# Patient Record
Sex: MALE | Race: WHITE | NOT HISPANIC OR LATINO | Employment: FULL TIME | URBAN - NONMETROPOLITAN AREA
[De-identification: names, ages, dates, MRNs, and addresses within clinical notes are randomized per-mention and may not be internally consistent; named-entity substitution may affect disease eponyms.]

---

## 2017-07-03 ENCOUNTER — OFFICE VISIT (OUTPATIENT)
Dept: CARDIOLOGY | Facility: CLINIC | Age: 67
End: 2017-07-03

## 2017-07-03 VITALS
HEIGHT: 70 IN | WEIGHT: 190 LBS | DIASTOLIC BLOOD PRESSURE: 80 MMHG | BODY MASS INDEX: 27.2 KG/M2 | HEART RATE: 56 BPM | SYSTOLIC BLOOD PRESSURE: 130 MMHG

## 2017-07-03 DIAGNOSIS — I25.9 IHD (ISCHEMIC HEART DISEASE): Primary | ICD-10-CM

## 2017-07-03 DIAGNOSIS — I34.0 NON-RHEUMATIC MITRAL REGURGITATION: ICD-10-CM

## 2017-07-03 DIAGNOSIS — E11.9 TYPE 2 DIABETES MELLITUS WITHOUT COMPLICATION, WITHOUT LONG-TERM CURRENT USE OF INSULIN (HCC): ICD-10-CM

## 2017-07-03 DIAGNOSIS — I10 ESSENTIAL HYPERTENSION: ICD-10-CM

## 2017-07-03 DIAGNOSIS — E78.49 OTHER HYPERLIPIDEMIA: ICD-10-CM

## 2017-07-03 PROCEDURE — 99213 OFFICE O/P EST LOW 20 MIN: CPT | Performed by: NURSE PRACTITIONER

## 2017-07-03 NOTE — PROGRESS NOTES
Chief Complaint   Patient presents with   • Follow-up     He denies any chest pain, palpitations, no dizziness and no shortness of breath. He states pushes mows 2-3 yards a week. He states had labs 2 weeks ago per PCP. Patient verbalized current medication list.       Cardiac Complaints  none      Subjective   Flako Mcfarland is a 66 y.o. male with a history of IHD diagnosed in 2004 when he had stenting. Most recent cardiac cath in 2009 showed stent in LAD which was patent. He then had about 80% disease of the diagonal which has been managed medically. Most recent stress in January of this year showed only fixed defect, medical management encouraged. Labs he reports with PCP, no recent copies available, he did have some done 3 weeks ago.   He comes today for follow up and denies any cardiac concerns, he denies any chest pain, palpitations, or dizziness.  He is push mowing his yard 2 to 3 times a week without concerns.  No medication refills are needed today.  Medication list confirmed by patient.        Cardiac History  Past Surgical History:   Procedure Laterality Date   • CARDIAC CATHETERIZATION  2004    MI & One stent.   • CARDIAC CATHETERIZATION  11/09/2009    Patent stent in the LAD.  80% D2.   • CARDIOVASCULAR STRESS TEST  04/14/2008    S. Echo- 9 min, 30 sec., 80% THR. Inferior infarct.   • CARDIOVASCULAR STRESS TEST  10/15/2009    9 min. 84% /96 Inferoseptal infarct with lani-infarct ischemia.   • CARDIOVASCULAR STRESS TEST  01/11/2013    9 min, 81% THR, 182/80 Large infarct with  mild lani-infarct ischemia.   • CARDIOVASCULAR STRESS TEST  01/14/2016    EF 54%, fixed defect no reversibility noted   • ECHO - CONVERTED  02/29/2008    EF 60%, RVSP- 42mmHg   • ECHO - CONVERTED  10/15/2009    EF 55% Inferoseptal WMA. RVSP- 37mmHg   • ECHO - CONVERTED  01/11/2013    EF 50-55%, RVSP 40 mmHg.   • ECHO - CONVERTED  01/14/2016    EF 50-55%, mild MR, mod pulm HTN       Current Outpatient Prescriptions    Medication Sig Dispense Refill   • aspirin 81 MG EC tablet Take 81 mg by mouth Daily.     • clopidogrel (PLAVIX) 75 MG tablet Take 75 mg by mouth Daily.     • Dulaglutide (TRULICITY) 1.5 MG/0.5ML solution pen-injector Inject  under the skin 1 (One) Time Per Week.     • gemfibrozil (LOPID) 600 MG tablet Take 600 mg by mouth Daily.     • isosorbide mononitrate (IMDUR) 30 MG 24 hr tablet TAKE 1 TABLET BY MOUTH EVERY MORNING 30 tablet 8   • lisinopril (PRINIVIL,ZESTRIL) 10 MG tablet Take 10 mg by mouth 2 (Two) Times a Day.     • metFORMIN (GLUCOPHAGE) 500 MG tablet Take 2 tabs twice daily      • metoprolol succinate XL (TOPROL-XL) 25 MG 24 hr tablet TAKE ONE TABLET BY MOUTH EVERY DAY 30 tablet 8   • rosuvastatin (CRESTOR) 20 MG tablet Take 20 mg by mouth Daily.     • SITagliptin (JANUVIA) 100 MG tablet Take 100 mg by mouth Daily.       No current facility-administered medications for this visit.        Review of patient's allergies indicates no known allergies.    Past Medical History:   Diagnosis Date   • Arthritis    • Diabetes mellitus    • Hyperlipidemia    • Hypertension    • IHD (ischemic heart disease)     Chronic   • Mitral valve insufficiency        Social History     Social History   • Marital status:      Spouse name: N/A   • Number of children: N/A   • Years of education: N/A     Occupational History   • Not on file.     Social History Main Topics   • Smoking status: Former Smoker     Quit date: 06/2004   • Smokeless tobacco: Never Used   • Alcohol use Yes      Comment: Occasional glass of wine   • Drug use: No   • Sexual activity: Not on file     Other Topics Concern   • Not on file     Social History Narrative       Family History   Problem Relation Age of Onset   • Hypertension Mother    • Hyperlipidemia Mother    • Heart disease Father        Review of Systems   Constitution: Negative for weakness and malaise/fatigue.   HENT: Negative for congestion and hoarse voice.    Cardiovascular: Negative  "for chest pain, dyspnea on exertion, irregular heartbeat and palpitations.   Musculoskeletal: Negative for arthritis and back pain.   Gastrointestinal: Negative for anorexia, heartburn, nausea and vomiting.   Genitourinary: Negative for hematuria and hesitancy.   Neurological: Negative for dizziness and numbness.   Psychiatric/Behavioral: Negative for altered mental status, depression and memory loss.       DiabetesYes  Thyroidnormal    Objective     /80 (BP Location: Left arm)  Pulse 56  Ht 70\" (177.8 cm)  Wt 190 lb (86.2 kg)  BMI 27.26 kg/m2    Physical Exam   Constitutional: He is oriented to person, place, and time. He appears well-developed and well-nourished.   HENT:   Head: Normocephalic and atraumatic.   Eyes: Conjunctivae are normal. Pupils are equal, round, and reactive to light.   Neck: Normal range of motion. Neck supple.   Cardiovascular: Regular rhythm.  Bradycardia present.    Murmur heard.  Pulmonary/Chest: Effort normal and breath sounds normal.   Abdominal: Soft.   Musculoskeletal: Normal range of motion.   Neurological: He is alert and oriented to person, place, and time.   Skin: Skin is warm and dry.   Psychiatric: He has a normal mood and affect. His behavior is normal.       Procedures    Assessment/Plan     HR is slightly bradycardic, most likely related to beta blocker therapy.  BP is stable.  No new testing will be advised today since no new concerns are voiced.  No changes in meds will be made.  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  Labs he reports with you, could we get next copy?  No refills are needed as they are filled with you.  Good cardiac ADA diet and activity as tolerated advised.  He does report recent AIC was elevated, he has been changing his diet.  6 month follow up advised or sooner if needed.      Problems Addressed this Visit        Cardiovascular and Mediastinum    IHD (ischemic heart disease) - Primary "    HTN (hypertension)    Hyperlipemia    MR (mitral regurgitation)       Endocrine    DM (diabetes mellitus)    Relevant Medications    Dulaglutide (TRULICITY) 1.5 MG/0.5ML solution pen-injector              Electronically signed by WAQAR Nascimento July 3, 2017 10:31 AM

## 2017-09-05 RX ORDER — ISOSORBIDE MONONITRATE 30 MG/1
TABLET, EXTENDED RELEASE ORAL
Qty: 30 TABLET | Refills: 8 | Status: SHIPPED | OUTPATIENT
Start: 2017-09-05 | End: 2020-06-29 | Stop reason: SDUPTHER

## 2017-09-05 RX ORDER — METOPROLOL SUCCINATE 25 MG/1
TABLET, EXTENDED RELEASE ORAL
Qty: 30 TABLET | Refills: 8 | Status: SHIPPED | OUTPATIENT
Start: 2017-09-05 | End: 2018-09-17 | Stop reason: SDUPTHER

## 2018-02-15 ENCOUNTER — OFFICE VISIT (OUTPATIENT)
Dept: CARDIOLOGY | Facility: CLINIC | Age: 68
End: 2018-02-15

## 2018-02-15 VITALS
HEART RATE: 64 BPM | WEIGHT: 189 LBS | DIASTOLIC BLOOD PRESSURE: 68 MMHG | SYSTOLIC BLOOD PRESSURE: 114 MMHG | BODY MASS INDEX: 27.06 KG/M2 | HEIGHT: 70 IN

## 2018-02-15 DIAGNOSIS — I10 ESSENTIAL HYPERTENSION: ICD-10-CM

## 2018-02-15 DIAGNOSIS — E78.00 PURE HYPERCHOLESTEROLEMIA: ICD-10-CM

## 2018-02-15 DIAGNOSIS — R01.1 CARDIAC MURMUR: ICD-10-CM

## 2018-02-15 DIAGNOSIS — I25.9 IHD (ISCHEMIC HEART DISEASE): Primary | ICD-10-CM

## 2018-02-15 DIAGNOSIS — E11.9 TYPE 2 DIABETES MELLITUS WITHOUT COMPLICATION, WITHOUT LONG-TERM CURRENT USE OF INSULIN (HCC): ICD-10-CM

## 2018-02-15 PROCEDURE — 99214 OFFICE O/P EST MOD 30 MIN: CPT | Performed by: NURSE PRACTITIONER

## 2018-02-15 RX ORDER — NITROGLYCERIN 0.4 MG/1
TABLET SUBLINGUAL
Qty: 25 TABLET | Refills: 6 | Status: SHIPPED | OUTPATIENT
Start: 2018-02-15 | End: 2018-09-17 | Stop reason: SDUPTHER

## 2018-02-15 RX ORDER — GLYBURIDE 5 MG/1
10 TABLET ORAL 2 TIMES DAILY WITH MEALS
COMMUNITY

## 2018-02-15 NOTE — PROGRESS NOTES
Chief Complaint   Patient presents with   • Follow-up     cardiac management, patient brought copy of most recent labs with visit.    • Med Refill     request script for NTG. patient brought medication list with visit.        Subjective       Flako Mcfarland is a 67 y.o. male with a history of IHD diagnosed in 2004 when he had stenting. Cardiac cath in 2009 showed stent in LAD which was patent and 80% disease of the diagonal which has been managed medically. Stress in January 2016 showed only fixed defect, medical management encouraged.   Today he comes to the office for a follow up visit and no chest pain or palpitations reported. He maintains active lifestyle. His current concern is increase in HA1C.     HPI         Cardiac History:    Past Surgical History:   Procedure Laterality Date   • CARDIAC CATHETERIZATION  2004    MI & One stent.   • CARDIAC CATHETERIZATION  11/09/2009    Patent stent in the LAD.  80% D2.   • CARDIOVASCULAR STRESS TEST  04/14/2008    S. Echo- 9 min, 30 sec., 80% THR. Inferior infarct.   • CARDIOVASCULAR STRESS TEST  10/15/2009    9 min. 84% /96 Inferoseptal infarct with lani-infarct ischemia.   • CARDIOVASCULAR STRESS TEST  01/11/2013    9 min, 81% THR, 182/80 Large infarct with  mild lani-infarct ischemia.   • CARDIOVASCULAR STRESS TEST  01/14/2016    EF 54%, fixed defect no reversibility noted   • ECHO - CONVERTED  02/29/2008    EF 60%, RVSP- 42mmHg   • ECHO - CONVERTED  10/15/2009    EF 55% Inferoseptal WMA. RVSP- 37mmHg   • ECHO - CONVERTED  01/11/2013    EF 50-55%, RVSP 40 mmHg.   • ECHO - CONVERTED  01/14/2016    EF 50-55%, mild MR, mod pulm HTN       Current Outpatient Prescriptions   Medication Sig Dispense Refill   • aspirin 81 MG EC tablet Take 81 mg by mouth Daily.     • clopidogrel (PLAVIX) 75 MG tablet Take 75 mg by mouth Daily.     • Dulaglutide (TRULICITY) 1.5 MG/0.5ML solution pen-injector Inject  under the skin 1 (One) Time Per Week.     • gemfibrozil (LOPID) 600  MG tablet Take 600 mg by mouth Daily.     • glyBURIDE (DIAbeta) 5 MG tablet Take 5 mg by mouth 2 (Two) Times a Day With Meals.     • isosorbide mononitrate (IMDUR) 30 MG 24 hr tablet TAKE ONE TABLET BY MOUTH EVERY MORNING 30 tablet 8   • lisinopril (PRINIVIL,ZESTRIL) 10 MG tablet Take 10 mg by mouth 2 (Two) Times a Day.     • metFORMIN (GLUCOPHAGE) 500 MG tablet Take 2 tabs twice daily      • metoprolol succinate XL (TOPROL-XL) 25 MG 24 hr tablet TAKE ONE TABLET BY MOUTH DAILY 30 tablet 8   • rosuvastatin (CRESTOR) 20 MG tablet Take 20 mg by mouth Daily.     • SITagliptin (JANUVIA) 100 MG tablet Take 100 mg by mouth Daily.     • nitroglycerin (NITROSTAT) 0.4 MG SL tablet 1 under the tongue as needed for angina, may repeat q5mins for up three doses 25 tablet 6     No current facility-administered medications for this visit.        Review of patient's allergies indicates no known allergies.    Past Medical History:   Diagnosis Date   • Arthritis    • Diabetes mellitus    • Hyperlipidemia    • Hypertension    • IHD (ischemic heart disease)     Chronic   • Mitral valve insufficiency        Social History     Social History   • Marital status:      Spouse name: N/A   • Number of children: N/A   • Years of education: N/A     Occupational History   • Not on file.     Social History Main Topics   • Smoking status: Former Smoker     Quit date: 06/2004   • Smokeless tobacco: Never Used   • Alcohol use Yes      Comment: Occasional glass of wine   • Drug use: No   • Sexual activity: Not on file     Other Topics Concern   • Not on file     Social History Narrative       Family History   Problem Relation Age of Onset   • Hypertension Mother    • Hyperlipidemia Mother    • Heart disease Father        Review of Systems   Constitution: Negative for decreased appetite, diaphoresis, malaise/fatigue and weight gain.   HENT: Negative for congestion and nosebleeds.    Cardiovascular: Negative for chest pain, irregular heartbeat,  "leg swelling, near-syncope and palpitations.   Respiratory: Negative for cough, shortness of breath and sleep disturbances due to breathing.    Skin: Negative for itching and rash.   Musculoskeletal: Negative for falls and muscle weakness.   Gastrointestinal: Negative for change in bowel habit and melena.   Genitourinary: Negative for dysuria and hematuria.   Neurological: Negative for dizziness, light-headedness and loss of balance.      Diabetes- Yes  Thyroid-normal    Objective     /68 (BP Location: Right arm)  Pulse 64  Ht 177.8 cm (70\")  Wt 85.7 kg (189 lb)  BMI 27.12 kg/m2    Physical Exam   Constitutional: He is oriented to person, place, and time. He appears well-nourished.   HENT:   Head: Normocephalic.   Eyes: Conjunctivae are normal. Pupils are equal, round, and reactive to light.   Neck: Normal range of motion. Neck supple. No JVD present.   Cardiovascular: Normal rate, regular rhythm, S1 normal and S2 normal.    No murmur heard.  Pulmonary/Chest: Effort normal and breath sounds normal. He has no wheezes. He has no rales.   Abdominal: Soft. Bowel sounds are normal. He exhibits no distension. There is no tenderness.   Musculoskeletal: Normal range of motion. He exhibits no edema or tenderness.   Neurological: He is alert and oriented to person, place, and time.   Skin: Skin is warm. No rash noted.   Psychiatric: He has a normal mood and affect. His behavior is normal. Judgment and thought content normal.   Vitals reviewed.     Procedures: none today          Assessment/Plan      Flako was seen today for follow-up and med refill.    Diagnoses and all orders for this visit:    IHD (ischemic heart disease)  -     Stress Test With Myocardial Perfusion One Day; Future  -     Adult Transthoracic Echo Complete W/ Cont if Necessary Per Protocol; Future    Essential hypertension  -     Stress Test With Myocardial Perfusion One Day; Future  -     Adult Transthoracic Echo Complete W/ Cont if Necessary Per " Protocol; Future    Pure hypercholesterolemia  -     Stress Test With Myocardial Perfusion One Day; Future  -     Adult Transthoracic Echo Complete W/ Cont if Necessary Per Protocol; Future    Cardiac murmur  -     Stress Test With Myocardial Perfusion One Day; Future  -     Adult Transthoracic Echo Complete W/ Cont if Necessary Per Protocol; Future    Type 2 diabetes mellitus without complication, without long-term current use of insulin    Other orders  -     nitroglycerin (NITROSTAT) 0.4 MG SL tablet; 1 under the tongue as needed for angina, may repeat q5mins for up three doses      Mr. Mcfarland brought a copy of most recent lab which shows lipids at goal. Advised to continue Crestor. His HA1C has increased. He will follow with you and consider referral to endocrinology. We did discuss diet management and he reports recent improvements.  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 Hs blood pressure and heart rate are normal today. We will continue same cardiac medications at this time. Nitrostat was updated due to expiration, he has not had to use any for symptoms.   He also reports recent abnormal lab prior to donating blood and is scheduled to see hematology in near future. I do not have a copy of that report.            Electronically signed by WAQAR Schroeder,  February 16, 2018 1:20 PM

## 2018-02-15 NOTE — PATIENT INSTRUCTIONS
Diabetes Mellitus and Food  It is important for you to manage your blood sugar (glucose) level. Your blood glucose level can be greatly affected by what you eat. Eating healthier foods in the appropriate amounts throughout the day at about the same time each day will help you control your blood glucose level. It can also help slow or prevent worsening of your diabetes mellitus. Healthy eating may even help you improve the level of your blood pressure and reach or maintain a healthy weight.  General recommendations for healthful eating and cooking habits include:  · Eating meals and snacks regularly. Avoid going long periods of time without eating to lose weight.  · Eating a diet that consists mainly of plant-based foods, such as fruits, vegetables, nuts, legumes, and whole grains.  · Using low-heat cooking methods, such as baking, instead of high-heat cooking methods, such as deep frying.  Work with your dietitian to make sure you understand how to use the Nutrition Facts information on food labels.  How can food affect me?  Carbohydrates   Carbohydrates affect your blood glucose level more than any other type of food. Your dietitian will help you determine how many carbohydrates to eat at each meal and teach you how to count carbohydrates. Counting carbohydrates is important to keep your blood glucose at a healthy level, especially if you are using insulin or taking certain medicines for diabetes mellitus.  Alcohol   Alcohol can cause sudden decreases in blood glucose (hypoglycemia), especially if you use insulin or take certain medicines for diabetes mellitus. Hypoglycemia can be a life-threatening condition. Symptoms of hypoglycemia (sleepiness, dizziness, and disorientation) are similar to symptoms of having too much alcohol.  If your health care provider has given you approval to drink alcohol, do so in moderation and use the following guidelines:  · Women should not have more than one drink per day, and men  should not have more than two drinks per day. One drink is equal to:  ¨ 12 oz of beer.  ¨ 5 oz of wine.  ¨ 1½ oz of hard liquor.  · Do not drink on an empty stomach.  · Keep yourself hydrated. Have water, diet soda, or unsweetened iced tea.  · Regular soda, juice, and other mixers might contain a lot of carbohydrates and should be counted.  What foods are not recommended?  As you make food choices, it is important to remember that all foods are not the same. Some foods have fewer nutrients per serving than other foods, even though they might have the same number of calories or carbohydrates. It is difficult to get your body what it needs when you eat foods with fewer nutrients. Examples of foods that you should avoid that are high in calories and carbohydrates but low in nutrients include:  · Trans fats (most processed foods list trans fats on the Nutrition Facts label).  · Regular soda.  · Juice.  · Candy.  · Sweets, such as cake, pie, doughnuts, and cookies.  · Fried foods.  What foods can I eat?  Eat nutrient-rich foods, which will nourish your body and keep you healthy. The food you should eat also will depend on several factors, including:  · The calories you need.  · The medicines you take.  · Your weight.  · Your blood glucose level.  · Your blood pressure level.  · Your cholesterol level.  You should eat a variety of foods, including:  · Protein.  ¨ Lean cuts of meat.  ¨ Proteins low in saturated fats, such as fish, egg whites, and beans. Avoid processed meats.  · Fruits and vegetables.  ¨ Fruits and vegetables that may help control blood glucose levels, such as apples, mangoes, and yams.  · Dairy products.  ¨ Choose fat-free or low-fat dairy products, such as milk, yogurt, and cheese.  · Grains, bread, pasta, and rice.  ¨ Choose whole grain products, such as multigrain bread, whole oats, and brown rice. These foods may help control blood pressure.  · Fats.  ¨ Foods containing healthful fats, such as nuts,  avocado, olive oil, canola oil, and fish.  Does everyone with diabetes mellitus have the same meal plan?  Because every person with diabetes mellitus is different, there is not one meal plan that works for everyone. It is very important that you meet with a dietitian who will help you create a meal plan that is just right for you.  This information is not intended to replace advice given to you by your health care provider. Make sure you discuss any questions you have with your health care provider.  Document Released: 09/14/2006 Document Revised: 05/25/2017 Document Reviewed: 11/14/2014  [employer] Interactive Patient Education © 2017 [employer]

## 2018-02-27 ENCOUNTER — OUTSIDE FACILITY SERVICE (OUTPATIENT)
Dept: CARDIOLOGY | Facility: CLINIC | Age: 68
End: 2018-02-27

## 2018-02-27 ENCOUNTER — HOSPITAL ENCOUNTER (OUTPATIENT)
Dept: CARDIOLOGY | Facility: HOSPITAL | Age: 68
Discharge: HOME | End: 2018-02-27

## 2018-02-27 DIAGNOSIS — I10 ESSENTIAL HYPERTENSION: ICD-10-CM

## 2018-02-27 DIAGNOSIS — I25.9 IHD (ISCHEMIC HEART DISEASE): ICD-10-CM

## 2018-02-27 DIAGNOSIS — E78.00 PURE HYPERCHOLESTEROLEMIA: ICD-10-CM

## 2018-02-27 DIAGNOSIS — R01.1 CARDIAC MURMUR: ICD-10-CM

## 2018-02-27 LAB
MAXIMAL PREDICTED HEART RATE: 154 BPM
MAXIMAL PREDICTED HEART RATE: 154 BPM
STRESS TARGET HR: 131 BPM
STRESS TARGET HR: 131 BPM

## 2018-02-27 PROCEDURE — 93306 TTE W/DOPPLER COMPLETE: CPT

## 2018-02-27 PROCEDURE — 93017 CV STRESS TEST TRACING ONLY: CPT

## 2018-02-27 PROCEDURE — 0 TECHNETIUM SESTAMIBI: Performed by: INTERNAL MEDICINE

## 2018-02-27 PROCEDURE — 78452 HT MUSCLE IMAGE SPECT MULT: CPT | Performed by: INTERNAL MEDICINE

## 2018-02-27 PROCEDURE — 93018 CV STRESS TEST I&R ONLY: CPT | Performed by: INTERNAL MEDICINE

## 2018-02-27 PROCEDURE — A9500 TC99M SESTAMIBI: HCPCS | Performed by: INTERNAL MEDICINE

## 2018-02-27 PROCEDURE — 93306 TTE W/DOPPLER COMPLETE: CPT | Performed by: INTERNAL MEDICINE

## 2018-02-27 PROCEDURE — 78452 HT MUSCLE IMAGE SPECT MULT: CPT

## 2018-02-27 RX ADMIN — TECHNETIUM TC 99M SESTAMIBI 1 DOSE: 1 INJECTION INTRAVENOUS at 11:45

## 2018-03-01 ENCOUNTER — TELEPHONE (OUTPATIENT)
Dept: CARDIOLOGY | Facility: CLINIC | Age: 68
End: 2018-03-01

## 2018-03-01 NOTE — TELEPHONE ENCOUNTER
Patient aware of stress test results and recommendations.   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    Patient requests I forward this information to Dr Nathan Ha, Chiropractor in [place], [place].    I have left a message at [phone] to confirm the fax #.

## 2018-03-05 NOTE — TELEPHONE ENCOUNTER
Patient called to give me the fax # for Dr. Nathan Ha  Fax# [phone].  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

## 2018-09-17 ENCOUNTER — OFFICE VISIT (OUTPATIENT)
Dept: CARDIOLOGY | Facility: CLINIC | Age: 68
End: 2018-09-17

## 2018-09-17 VITALS
HEART RATE: 64 BPM | WEIGHT: 188 LBS | HEIGHT: 70 IN | DIASTOLIC BLOOD PRESSURE: 82 MMHG | BODY MASS INDEX: 26.92 KG/M2 | SYSTOLIC BLOOD PRESSURE: 138 MMHG

## 2018-09-17 DIAGNOSIS — I34.0 NON-RHEUMATIC MITRAL REGURGITATION: ICD-10-CM

## 2018-09-17 DIAGNOSIS — E11.9 TYPE 2 DIABETES MELLITUS WITHOUT COMPLICATION, WITHOUT LONG-TERM CURRENT USE OF INSULIN (HCC): ICD-10-CM

## 2018-09-17 DIAGNOSIS — I25.9 IHD (ISCHEMIC HEART DISEASE): Primary | ICD-10-CM

## 2018-09-17 DIAGNOSIS — E78.00 PURE HYPERCHOLESTEROLEMIA: ICD-10-CM

## 2018-09-17 DIAGNOSIS — I10 ESSENTIAL HYPERTENSION: ICD-10-CM

## 2018-09-17 PROCEDURE — 99213 OFFICE O/P EST LOW 20 MIN: CPT | Performed by: NURSE PRACTITIONER

## 2018-09-17 RX ORDER — METOPROLOL SUCCINATE 25 MG/1
25 TABLET, EXTENDED RELEASE ORAL DAILY
Qty: 30 TABLET | Refills: 12 | Status: SHIPPED | OUTPATIENT
Start: 2018-09-17 | End: 2019-10-18 | Stop reason: SDUPTHER

## 2018-09-17 RX ORDER — GEMFIBROZIL 600 MG/1
600 TABLET, FILM COATED ORAL DAILY
Qty: 30 TABLET | Refills: 12 | Status: SHIPPED | OUTPATIENT
Start: 2018-09-17 | End: 2019-10-18 | Stop reason: SDUPTHER

## 2018-09-17 RX ORDER — NITROGLYCERIN 0.4 MG/1
TABLET SUBLINGUAL
Qty: 25 TABLET | Refills: 6 | Status: SHIPPED | OUTPATIENT
Start: 2018-09-17 | End: 2023-03-06 | Stop reason: SDUPTHER

## 2018-09-17 RX ORDER — ROSUVASTATIN CALCIUM 20 MG/1
20 TABLET, COATED ORAL DAILY
Qty: 30 TABLET | Refills: 12 | Status: SHIPPED | OUTPATIENT
Start: 2018-09-17 | End: 2020-06-29 | Stop reason: SDUPTHER

## 2018-09-17 RX ORDER — CLOPIDOGREL BISULFATE 75 MG/1
75 TABLET ORAL DAILY
Qty: 30 TABLET | Refills: 12 | Status: SHIPPED | OUTPATIENT
Start: 2018-09-17 | End: 2019-10-18 | Stop reason: SDUPTHER

## 2018-09-17 NOTE — PATIENT INSTRUCTIONS
Cholesterol  Cholesterol is a fat. Your body needs a small amount of cholesterol. Cholesterol (plaque) may build up in your blood vessels (arteries). That makes you more likely to have a heart attack or stroke.  You cannot feel your cholesterol level. Having a blood test is the only way to find out if your level is high. Keep your test results. Work with your doctor to keep your cholesterol at a good level.  What do the results mean?  · Total cholesterol is how much cholesterol is in your blood.  · LDL is bad cholesterol. This is the type that can build up. Try to have low LDL.  · HDL is good cholesterol. It cleans your blood vessels and carries LDL away. Try to have high HDL.  · Triglycerides are fat that the body can store or burn for energy.  What are good levels of cholesterol?  · Total cholesterol below 200.  · LDL below 100 is good for people who have health risks. LDL below 70 is good for people who have very high risks.  · HDL above 40 is good. It is best to have HDL of 60 or higher.  · Triglycerides below 150.  How can I lower my cholesterol?  Diet  Follow your diet program as told by your doctor.  · Choose fish, white meat chicken, or turkey that is roasted or baked. Try not to eat red meat, fried foods, sausage, or lunch meats.  · Eat lots of fresh fruits and vegetables.  · Choose whole grains, beans, pasta, potatoes, and cereals.  · Choose olive oil, corn oil, or canola oil. Only use small amounts.  · Try not to eat butter, mayonnaise, shortening, or palm kernel oils.  · Try not to eat foods with trans fats.  · Choose low-fat or nonfat dairy foods.  ? Drink skim or nonfat milk.  ? Eat low-fat or nonfat yogurt and cheeses.  ? Try not to drink whole milk or cream.  ? Try not to eat ice cream, egg yolks, or full-fat cheeses.  · Healthy desserts include mila food cake, jacinda snaps, animal crackers, hard candy, popsicles, and low-fat or nonfat frozen yogurt. Try not to eat pastries, cakes, pies, and  cookies.    Exercise  Follow your exercise program as told by your doctor.  · Be more active. Try gardening, walking, and taking the stairs.  · Ask your doctor about ways that you can be more active.    Medicine  · Take over-the-counter and prescription medicines only as told by your doctor.  This information is not intended to replace advice given to you by your health care provider. Make sure you discuss any questions you have with your health care provider.  Document Released: 03/16/2010 Document Revised: 07/19/2017 Document Reviewed: 06/29/2017  [employer] Interactive Patient Education © 2018 [employer]

## 2018-09-17 NOTE — PROGRESS NOTES
Chief Complaint   Patient presents with   • Follow-up     For cardiac management. Brought copy of recent labs.    • Med Refill     Needs refills on cardiac meds for cardiac meds for 30 days to [facility]. Also asking for a refill on Nitro. Didn't bring med list- went over verbally.        Subjective       Flako Mcfarland is a 68 y.o. male  with a history of IHD diagnosed in 2004 when he had stenting. Cardiac cath in 2009 showed stent in LAD which was patent and 80% disease of the diagonal which has been managed medically. Stress in January 2016 showed only fixed defect, medical management contiinued. He has [employer] licenses and at his 2/15/18 office visit, repeat cardiac workup ordered per CDL guidelines. Stress showed anterior infarct without ischemia. LVEF 55-60% and RVSP 36 mmHg. 8/17/18 lab report shows , triglycerides 110, LDL 73 and HDL 39. HA1C is 6.8 improved from previous.     Today he comes to the office for a follow up visit and no cardiac complaints are voiced. He maintains his won yard work including push mowing and maintains full time work. He has not had chest pain or palpitations. No shortness of breath or episodes of dizziness noted.     HPI     Cardiac History:    Past Surgical History:   Procedure Laterality Date   • CARDIAC CATHETERIZATION  2004    MI & One stent.   • CARDIAC CATHETERIZATION  11/09/2009    Patent stent in the LAD.  80% D2.   • CARDIOVASCULAR STRESS TEST  04/14/2008    S. Echo- 9 min, 30 sec., 80% THR. Inferior infarct.   • CARDIOVASCULAR STRESS TEST  10/15/2009    9 min. 84% /96 Inferoseptal infarct with lani-infarct ischemia.   • CARDIOVASCULAR STRESS TEST  01/11/2013    9 min, 81% THR, 182/80 Large infarct with  mild lani-infarct ischemia.   • CARDIOVASCULAR STRESS TEST  01/14/2016    9 Min, 32 Secs.83% THR. EF 54%. Anteroseptal Infarct   • CARDIOVASCULAR STRESS TEST  02/27/2018    10 Min, 83% THR. 12.2 Mets. EF 57%. Anterior Infarct,.   • ECHO - CONVERTED   02/29/2008    EF 60%, RVSP- 42mmHg   • ECHO - CONVERTED  10/15/2009    EF 55% Inferoseptal WMA. RVSP- 37mmHg   • ECHO - CONVERTED  01/11/2013    EF 50-55%, RVSP 40 mmHg.   • ECHO - CONVERTED  01/14/2016    EF 50-55%, mild MR, mod pulm HTN   • ECHO - CONVERTED  02/27/2018    EF 55%. RVSP- 36 mmHg       Current Outpatient Prescriptions   Medication Sig Dispense Refill   • aspirin 81 MG EC tablet Take 81 mg by mouth Daily.     • clopidogrel (PLAVIX) 75 MG tablet Take 1 tablet by mouth Daily. 30 tablet 12   • Dulaglutide (TRULICITY) 1.5 MG/0.5ML solution pen-injector Inject  under the skin 1 (One) Time Per Week.     • gemfibrozil (LOPID) 600 MG tablet Take 1 tablet by mouth Daily. 30 tablet 12   • glyBURIDE (DIAbeta) 5 MG tablet Take 5 mg by mouth 2 (Two) Times a Day With Meals.     • isosorbide mononitrate (IMDUR) 30 MG 24 hr tablet TAKE ONE TABLET BY MOUTH EVERY MORNING 30 tablet 8   • lisinopril (PRINIVIL,ZESTRIL) 10 MG tablet Take 10 mg by mouth 2 (Two) Times a Day.     • metFORMIN (GLUCOPHAGE) 500 MG tablet Take 2 tabs twice daily      • metoprolol succinate XL (TOPROL-XL) 25 MG 24 hr tablet Take 1 tablet by mouth Daily. 30 tablet 12   • nitroglycerin (NITROSTAT) 0.4 MG SL tablet 1 under the tongue as needed for angina, may repeat q5mins for up three doses 25 tablet 6   • rosuvastatin (CRESTOR) 20 MG tablet Take 1 tablet by mouth Daily. 30 tablet 12   • SITagliptin (JANUVIA) 100 MG tablet Take 100 mg by mouth Daily.       No current facility-administered medications for this visit.        Patient has no known allergies.    Past Medical History:   Diagnosis Date   • Arthritis    • Diabetes mellitus (CMS/HCC)    • Hyperlipidemia    • Hypertension    • IHD (ischemic heart disease)     Chronic   • Mitral valve insufficiency        Social History     Social History   • Marital status:      Spouse name: N/A   • Number of children: N/A   • Years of education: N/A     Occupational History   • Not on file.     Social  "History Main Topics   • Smoking status: Former Smoker     Quit date: 06/2004   • Smokeless tobacco: Never Used   • Alcohol use Yes      Comment: Occasional glass of wine   • Drug use: No   • Sexual activity: Not on file     Other Topics Concern   • Not on file     Social History Narrative   • No narrative on file       Family History   Problem Relation Age of Onset   • Hypertension Mother    • Hyperlipidemia Mother    • Heart disease Father        Review of Systems   Constitution: Negative for decreased appetite, weakness and malaise/fatigue.   HENT: Negative for congestion, hoarse voice and nosebleeds.    Eyes: Negative for redness and visual disturbance.   Cardiovascular: Negative for chest pain, claudication, dyspnea on exertion, leg swelling, near-syncope and palpitations.   Respiratory: Negative for cough, shortness of breath and sleep disturbances due to breathing.    Hematologic/Lymphatic: Negative for bleeding problem. Bruises/bleeds easily.   Skin: Negative for dry skin and itching.   Musculoskeletal: Negative for muscle cramps and muscle weakness.   Gastrointestinal: Negative for change in bowel habit and melena.   Genitourinary: Negative for dysuria and hematuria.   Neurological: Negative for dizziness, headaches, light-headedness, loss of balance and tremors.        Objective     /82   Pulse 64   Ht 177.8 cm (70\")   Wt 85.3 kg (188 lb)   BMI 26.98 kg/m²     Physical Exam   Constitutional: He is oriented to person, place, and time. Vital signs are normal. He appears well-developed and well-nourished. No distress.   HENT:   Head: Normocephalic.   Eyes: Pupils are equal, round, and reactive to light. Conjunctivae are normal.   Neck: Normal range of motion. Neck supple. No JVD present. Carotid bruit is not present.   Cardiovascular: Normal rate, regular rhythm, S1 normal and S2 normal.    No murmur heard.  Pulses:       Radial pulses are 2+ on the right side, and 2+ on the left side. "   Pulmonary/Chest: Effort normal and breath sounds normal. He has no wheezes. He has no rales.   Abdominal: Soft. Bowel sounds are normal. He exhibits no distension. There is no tenderness.   Musculoskeletal: Normal range of motion. He exhibits no edema or tenderness.   Neurological: He is alert and oriented to person, place, and time.   Skin: Skin is warm and dry.   Psychiatric: He has a normal mood and affect. His behavior is normal.      Procedures: none today      Assessment/Plan      Flako was seen today for follow-up and med refill.    Diagnoses and all orders for this visit:    IHD (ischemic heart disease)    Essential hypertension    Pure hypercholesterolemia    Non-rheumatic mitral regurgitation    Type 2 diabetes mellitus without complication, without long-term current use of insulin (CMS/[facility])    Other orders  -     rosuvastatin (CRESTOR) 20 MG tablet; Take 1 tablet by mouth Daily.  -     nitroglycerin (NITROSTAT) 0.4 MG SL tablet; 1 under the tongue as needed for angina, may repeat q5mins for up three doses  -     metoprolol succinate XL (TOPROL-XL) 25 MG 24 hr tablet; Take 1 tablet by mouth Daily.  -     gemfibrozil (LOPID) 600 MG tablet; Take 1 tablet by mouth Daily.  -     clopidogrel (PLAVIX) 75 MG tablet; Take 1 tablet by mouth Daily.    The reports of his recent stress and echocardiogram were reviewed which showed good effort tolerance, normal LV function and non ischemia.     August lab report reviewed which shows lipids at goal. Same dose Crest and Lopid advised. Refills given. HA1C is improved. He will follow with you for diabetic management.     His blood pressure, heart rate and rhythm today are normal. Same antihypertensive medications advised and refills given.     For CAD he is taking Plavix and aspirin without signs of bleeding or GI symptoms. Continue same.     Patient's Body mass index is 26.98 kg/m². BMI is above normal parameters. Recommendations include: nutrition counseling. Heart  healthy, diabetic diet encouraged.     He maintains daily physical activity without symptoms, continue same.     From a cardiac standpoint, Mr. Mcfarland appears stable. We will plan to see him back in 6 months. Please call sooner for any cardiac concerns.             Electronically signed by WAQAR Schroeder,  September 17, 2018 9:59 AM

## 2019-03-18 ENCOUNTER — OFFICE VISIT (OUTPATIENT)
Dept: CARDIOLOGY | Facility: CLINIC | Age: 69
End: 2019-03-18

## 2019-03-18 VITALS
SYSTOLIC BLOOD PRESSURE: 90 MMHG | HEIGHT: 70 IN | HEART RATE: 72 BPM | WEIGHT: 190 LBS | DIASTOLIC BLOOD PRESSURE: 60 MMHG | BODY MASS INDEX: 27.2 KG/M2

## 2019-03-18 DIAGNOSIS — E11.9 TYPE 2 DIABETES MELLITUS WITHOUT COMPLICATION, WITHOUT LONG-TERM CURRENT USE OF INSULIN (HCC): ICD-10-CM

## 2019-03-18 DIAGNOSIS — E78.2 MIXED HYPERLIPIDEMIA: ICD-10-CM

## 2019-03-18 DIAGNOSIS — B34.9 VIRAL ILLNESS: ICD-10-CM

## 2019-03-18 DIAGNOSIS — I25.9 IHD (ISCHEMIC HEART DISEASE): Primary | ICD-10-CM

## 2019-03-18 DIAGNOSIS — R94.39 ABNORMAL STRESS TEST: ICD-10-CM

## 2019-03-18 DIAGNOSIS — E66.3 OVERWEIGHT: ICD-10-CM

## 2019-03-18 DIAGNOSIS — I10 ESSENTIAL HYPERTENSION: ICD-10-CM

## 2019-03-18 DIAGNOSIS — I34.0 NON-RHEUMATIC MITRAL REGURGITATION: ICD-10-CM

## 2019-03-18 PROCEDURE — 99214 OFFICE O/P EST MOD 30 MIN: CPT | Performed by: NURSE PRACTITIONER

## 2019-03-18 NOTE — PROGRESS NOTES
Chief Complaint   Patient presents with   • Follow-up     For cardiac management. Patient is on aspirin. Reports that he did have some shortness of breath over the weekend. Last lab work was done in December 2018 per PCP, not in chart.    • Med Refill     Did not bring medication list.        Cardiac Complaints  none      Subjective   Flako Mcfarland is a 68 y.o. male with HTN, hyperlipidemia, DM, and IHD diagnosed in 2004 when he underwent stenting. Cardiac cath in 2009 showed stent in LAD was patent and 80% disease of the diagonal which has been managed medically. Stress in January 2016 showed only fixed defect, medical management contiinued. He has [employer] licenses and at his 2/15/18 office visit, repeat cardiac workup ordered per CD guidelines. Stress showed anterior infarct without ischemia. LVEF 55-60% and RVSP 36 mmHg.  Patient returns today and admits to illness over the weekend.  When questioned, patient reports what he thinks has been the flu.  He became feverish on Thursday and went to PCP where he was swabbed for the flu.  He admits the swab was negative but was treated with tamiflu.  He reports being very fatigued, lack of appetite, and winded over the weekend but states symptoms are slowly improving.  Chest pain, shortness of breath, palpitations, and dizziness denied today.  He does admit to slowly getting his appetite back and is drinking gatorade and powerade in regards to fluid maintenance. No refills needed. Most recent labs from December show BUN 14, creatinine 0.91, GFR >60, Na 137, K 4.2, Ca 9.6, TRIG 140, HDL 32, LDL 59, H/H 14.9/44.5, TSH 3.06, and PSA 1.4.            Cardiac History  Past Surgical History:   Procedure Laterality Date   • CARDIAC CATHETERIZATION  2004    MI & One stent.   • CARDIAC CATHETERIZATION  11/09/2009    Patent stent in the LAD.  80% D2.   • CARDIOVASCULAR STRESS TEST  04/14/2008    S. Echo- 9 min, 30 sec., 80% THR. Inferior infarct.   • CARDIOVASCULAR STRESS TEST   10/15/2009    9 min. 84% /96 Inferoseptal infarct with lani-infarct ischemia.   • CARDIOVASCULAR STRESS TEST  01/11/2013    9 min, 81% THR, 182/80 Large infarct with  mild lani-infarct ischemia.   • CARDIOVASCULAR STRESS TEST  01/14/2016    9 Min, 32 Secs.83% THR. EF 54%. Anteroseptal Infarct   • CARDIOVASCULAR STRESS TEST  02/27/2018    10 Min, 83% THR. 12.2 Mets. EF 57%. Anterior Infarct,.   • ECHO - CONVERTED  02/29/2008    EF 60%, RVSP- 42mmHg   • ECHO - CONVERTED  10/15/2009    EF 55% Inferoseptal WMA. RVSP- 37mmHg   • ECHO - CONVERTED  01/11/2013    EF 50-55%, RVSP 40 mmHg.   • ECHO - CONVERTED  01/14/2016    EF 50-55%, mild MR, mod pulm HTN   • ECHO - CONVERTED  02/27/2018    EF 55%. RVSP- 36 mmHg       Current Outpatient Medications   Medication Sig Dispense Refill   • Linagliptin (TRADJENTA PO) Take  by mouth.     • aspirin 81 MG EC tablet Take 81 mg by mouth Daily.     • clopidogrel (PLAVIX) 75 MG tablet Take 1 tablet by mouth Daily. 30 tablet 12   • Dulaglutide (TRULICITY) 1.5 MG/0.5ML solution pen-injector Inject  under the skin 1 (One) Time Per Week.     • gemfibrozil (LOPID) 600 MG tablet Take 1 tablet by mouth Daily. 30 tablet 12   • glyBURIDE (DIAbeta) 5 MG tablet Take 5 mg by mouth 2 (Two) Times a Day With Meals.     • isosorbide mononitrate (IMDUR) 30 MG 24 hr tablet TAKE ONE TABLET BY MOUTH EVERY MORNING 30 tablet 8   • lisinopril (PRINIVIL,ZESTRIL) 10 MG tablet Take 10 mg by mouth 2 (Two) Times a Day.     • metFORMIN (GLUCOPHAGE) 500 MG tablet Take 2 tabs twice daily      • metoprolol succinate XL (TOPROL-XL) 25 MG 24 hr tablet Take 1 tablet by mouth Daily. 30 tablet 12   • nitroglycerin (NITROSTAT) 0.4 MG SL tablet 1 under the tongue as needed for angina, may repeat q5mins for up three doses 25 tablet 6   • rosuvastatin (CRESTOR) 20 MG tablet Take 1 tablet by mouth Daily. 30 tablet 12     No current facility-administered medications for this visit.        Patient has no known  allergies.    Past Medical History:   Diagnosis Date   • Arthritis    • Diabetes mellitus (CMS/HCC)    • Hyperlipidemia    • Hypertension    • IHD (ischemic heart disease)     Chronic   • Mitral valve insufficiency        Social History     Socioeconomic History   • Marital status:      Spouse name: Not on file   • Number of children: Not on file   • Years of education: Not on file   • Highest education level: Not on file   Social Needs   • Financial resource strain: Not on file   • Food insecurity - worry: Not on file   • Food insecurity - inability: Not on file   • Transportation needs - medical: Not on file   • Transportation needs - non-medical: Not on file   Occupational History   • Not on file   Tobacco Use   • Smoking status: Former Smoker     Last attempt to quit: 2004     Years since quittin.8   • Smokeless tobacco: Never Used   Substance and Sexual Activity   • Alcohol use: Yes     Comment: Occasional glass of wine   • Drug use: No   • Sexual activity: Not on file   Other Topics Concern   • Not on file   Social History Narrative   • Not on file       Family History   Problem Relation Age of Onset   • Hypertension Mother    • Hyperlipidemia Mother    • Heart disease Father        Review of Systems   Constitution: Positive for weakness. Negative for malaise/fatigue.   Cardiovascular: Negative for chest pain, claudication, dyspnea on exertion, irregular heartbeat, near-syncope, orthopnea, palpitations and syncope.   Respiratory: Negative for cough, shortness of breath and wheezing.    Musculoskeletal: Negative for back pain, joint pain and joint swelling.   Gastrointestinal: Negative for anorexia, heartburn, nausea and vomiting.   Genitourinary: Negative for dysuria, hematuria and hesitancy.   Neurological: Negative for dizziness, light-headedness, loss of balance and numbness.   Psychiatric/Behavioral: Negative for depression and memory loss. The patient is not nervous/anxious.   "          Objective     BP 90/60 (BP Location: Left arm)   Pulse 72   Ht 177.8 cm (70\")   Wt 86.2 kg (190 lb)   BMI 27.26 kg/m²     Physical Exam   Constitutional: He is oriented to person, place, and time. He appears well-developed.   HENT:   Head: Normocephalic and atraumatic.   Eyes: EOM are normal. Pupils are equal, round, and reactive to light.   Neck: Normal range of motion. Neck supple.   Cardiovascular: Normal rate and regular rhythm.   Murmur heard.  Pulmonary/Chest: Effort normal and breath sounds normal.   Abdominal: Soft.   Musculoskeletal: Normal range of motion.   Neurological: He is alert and oriented to person, place, and time.   Skin: Skin is warm and dry.   Psychiatric: He has a normal mood and affect. His behavior is normal.       Procedures    Assessment/Plan     HR is stable today as well as blood pressure.  HTN is well managed and is actually low side of normal at 90/60.  He denies any dizziness or weakness.  Patient was encouraged to ensure adequate fluid intake as he was sick over the weekend and to continue to monitor BP at home with log. For history of IHD, he has continued on DAPT therapy and tolerates well. Bleeding and bruising are denied.  For history of abnormal stress, he continues to take imdur therapy and chest pain is denied.  Current imdur dosing continued.  He continues on crestor therapy and lopid for hyperlipidemia management and tolerates well.  Most recent FLP shows lipids at goal, same dosing advised.  No recent AIC is available in regards to DM management but patient thinks most recent looked okay.  He will continue to follow with you in regards.  BMI elevated at 27.26, good cardiac ADA diet with limited caloric intake, carbs, and activity as tolerated advised.  6 month follow up recommended or sooner if needed.  No refills needed at this time.            Problems Addressed this Visit        Cardiovascular and Mediastinum    IHD (ischemic heart disease) - Primary    HTN " (hypertension)    Hyperlipemia    MR (mitral regurgitation)       Endocrine    DM (diabetes mellitus) (CMS/HCC)    Relevant Medications    Linagliptin (TRADJENTA PO)      Other Visit Diagnoses     Overweight        Viral illness              Patient's Body mass index is 27.26 kg/m². BMI is above normal parameters. Recommendations include: nutrition counseling.              Electronically signed by WAQAR Nascimento March 18, 2019 5:22 PM

## 2019-10-28 RX ORDER — METOPROLOL SUCCINATE 25 MG/1
TABLET, EXTENDED RELEASE ORAL
Qty: 30 TABLET | Refills: 11 | Status: SHIPPED | OUTPATIENT
Start: 2019-10-28 | End: 2020-11-05

## 2019-10-28 RX ORDER — CLOPIDOGREL BISULFATE 75 MG/1
TABLET ORAL
Qty: 30 TABLET | Refills: 11 | Status: SHIPPED | OUTPATIENT
Start: 2019-10-28 | End: 2020-06-29 | Stop reason: ALTCHOICE

## 2019-10-28 RX ORDER — GEMFIBROZIL 600 MG/1
TABLET, FILM COATED ORAL
Qty: 30 TABLET | Refills: 11 | Status: SHIPPED | OUTPATIENT
Start: 2019-10-28 | End: 2020-06-29 | Stop reason: SDUPTHER

## 2019-12-12 ENCOUNTER — OFFICE VISIT (OUTPATIENT)
Dept: CARDIOLOGY | Facility: CLINIC | Age: 69
End: 2019-12-12

## 2019-12-12 VITALS
HEIGHT: 70 IN | SYSTOLIC BLOOD PRESSURE: 120 MMHG | HEART RATE: 76 BPM | DIASTOLIC BLOOD PRESSURE: 70 MMHG | WEIGHT: 187.6 LBS | BODY MASS INDEX: 26.86 KG/M2

## 2019-12-12 DIAGNOSIS — I25.9 IHD (ISCHEMIC HEART DISEASE): Primary | ICD-10-CM

## 2019-12-12 DIAGNOSIS — R10.9 ABDOMINAL DISCOMFORT: ICD-10-CM

## 2019-12-12 DIAGNOSIS — R06.02 SHORTNESS OF BREATH: ICD-10-CM

## 2019-12-12 DIAGNOSIS — E78.2 MIXED HYPERLIPIDEMIA: ICD-10-CM

## 2019-12-12 DIAGNOSIS — I34.0 NONRHEUMATIC MITRAL VALVE REGURGITATION: ICD-10-CM

## 2019-12-12 DIAGNOSIS — I10 ESSENTIAL HYPERTENSION: ICD-10-CM

## 2019-12-12 PROCEDURE — 99214 OFFICE O/P EST MOD 30 MIN: CPT | Performed by: NURSE PRACTITIONER

## 2019-12-12 NOTE — PROGRESS NOTES
Chief Complaint   Patient presents with   • Follow-up     Cardiac management . Has no cardiac complaints today. Hd labs per PCP, they are to fax them to us.   • Med Refill     No refills  needed today. Reviewed verbally       Subjective       Flako Mcfarland is a 69 y.o. male  with HTN, hyperlipidemia, DM, and IHD diagnosed in 2004 when he underwent stenting. Cardiac cath in 2009 showed stent in LAD was patent and 80% disease of the diagonal which has been managed medically. Stress in January 2016 showed only fixed defect, medical management contiinued. 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 Stress showed anterior infarct without ischemia. LVEF 55-60% and RVSP 36 mmHg.    Today he comes to the office for a follow up visit. 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 No cardiac symptoms or cardiac concerns voiced. He does admit to increased shortness of breath with exertion. No chest pain, palpitations or dizziness noted. He has had right sided abdominal pain for which ultrasound was done and results normal according to patient.  No recent change in cardiac medications noted.    HPI     Cardiac History:    Past Surgical History:   Procedure Laterality Date   • CARDIAC CATHETERIZATION  2004    MI & One stent.   • CARDIAC CATHETERIZATION  11/09/2009    Patent stent in the LAD.  80% D2.   • CARDIOVASCULAR STRESS TEST  04/14/2008    S. Echo- 9 min, 30 sec., 80% THR. Inferior infarct.   • CARDIOVASCULAR STRESS TEST  10/15/2009    9 min. 84% /96 Inferoseptal infarct with lani-infarct ischemia.   • CARDIOVASCULAR STRESS TEST  01/11/2013    9 min, 81% THR, 182/80 Large infarct with  mild lani-infarct ischemia.   • CARDIOVASCULAR STRESS TEST  01/14/2016    9 Min, 32 Secs.83% THR. EF 54%. Anteroseptal Infarct   • CARDIOVASCULAR STRESS TEST  02/27/2018    10 Min, 83% THR. 12.2 Mets. EF 57%. Anterior Infarct,.   • ECHO - CONVERTED  02/29/2008    EF 60%,  RVSP- 42mmHg   • ECHO - CONVERTED  10/15/2009    EF 55% Inferoseptal WMA. RVSP- 37mmHg   • ECHO - CONVERTED  01/11/2013    EF 50-55%, RVSP 40 mmHg.   • ECHO - CONVERTED  01/14/2016    EF 50-55%, mild MR, mod pulm HTN   • ECHO - CONVERTED  02/27/2018    EF 55%. RVSP- 36 mmHg       Current Outpatient Medications   Medication Sig Dispense Refill   • aspirin 81 MG EC tablet Take 81 mg by mouth Daily.     • clopidogrel (PLAVIX) 75 MG tablet TAKE ONE TABLET BY MOUTH DAILY 30 tablet 11   • Dulaglutide (TRULICITY) 1.5 MG/0.5ML solution pen-injector Inject  under the skin 1 (One) Time Per Week.     • gemfibrozil (LOPID) 600 MG tablet TAKE ONE TABLET BY MOUTH DAILY 30 tablet 11   • glyBURIDE (DIAbeta) 5 MG tablet Take 5 mg by mouth 2 (Two) Times a Day With Meals.     • isosorbide mononitrate (IMDUR) 30 MG 24 hr tablet TAKE ONE TABLET BY MOUTH EVERY MORNING 30 tablet 8   • Linagliptin (TRADJENTA PO) Take  by mouth.     • lisinopril (PRINIVIL,ZESTRIL) 10 MG tablet Take 10 mg by mouth 2 (Two) Times a Day.     • metFORMIN (GLUCOPHAGE) 500 MG tablet Take 2 tabs twice daily      • metoprolol succinate XL (TOPROL-XL) 25 MG 24 hr tablet TAKE ONE TABLET BY MOUTH DAILY 30 tablet 11   • nitroglycerin (NITROSTAT) 0.4 MG SL tablet 1 under the tongue as needed for angina, may repeat q5mins for up three doses 25 tablet 6   • rosuvastatin (CRESTOR) 20 MG tablet Take 1 tablet by mouth Daily. 30 tablet 12     No current facility-administered medications for this visit.        Patient has no known allergies.    Past Medical History:   Diagnosis Date   • Arthritis    • Diabetes mellitus (CMS/HCC)    • Hyperlipidemia    • Hypertension    • IHD (ischemic heart disease)     Chronic   • Mitral valve insufficiency        Social History     Socioeconomic History   • Marital status:      Spouse name: Not on file   • Number of children: Not on file   • Years of education: Not on file   • Highest education level: Not on file   Tobacco Use   •  "Smoking status: Former Smoker     Last attempt to quit: 06/2004     Years since quitting: 15.5   • Smokeless tobacco: Never Used   Substance and Sexual Activity   • Alcohol use: Yes     Comment: Occasional glass of wine   • Drug use: No       Family History   Problem Relation Age of Onset   • Hypertension Mother    • Hyperlipidemia Mother    • Heart disease Father        Review of Systems   Constitution: Negative for decreased appetite, diaphoresis and fever.   HENT: Negative for congestion, hoarse voice and nosebleeds.    Eyes: Negative.    Cardiovascular: Negative for chest pain, claudication, leg swelling, near-syncope and palpitations.   Respiratory: Positive for shortness of breath (with exertion). Negative for cough and sleep disturbances due to breathing.    Endocrine: Negative.    Skin: Negative for itching and rash.   Musculoskeletal: Negative for falls, muscle cramps and muscle weakness.   Gastrointestinal: Positive for bloating and abdominal pain. Negative for change in bowel habit, dysphagia, melena, nausea and vomiting.        According to patient recent ultrasound of abdomen with normal results   Genitourinary: Negative.    Neurological: Negative.    Psychiatric/Behavioral: Negative.    Allergic/Immunologic: Negative.         Objective       labs from December 2018:  BUN 14, creatinine 0.91, GFR >60, Na 137, K 4.2, Ca 9.6, TRIG 140, HDL 32, LDL 59, H/H 14.9/44.5, TSH 3.06, and PSA 1.4.      /70 (BP Location: Left arm)   Pulse 76   Ht 177.8 cm (70\")   Wt 85.1 kg (187 lb 9.6 oz)   BMI 26.92 kg/m²     Physical Exam   Constitutional: He is oriented to person, place, and time. He appears well-nourished.   HENT:   Head: Normocephalic.   Eyes: Pupils are equal, round, and reactive to light. Conjunctivae are normal.   Neck: Normal range of motion. Neck supple. Carotid bruit is not present.   Cardiovascular: Normal rate, regular rhythm, S1 normal, S2 normal and normal pulses.   No murmur " heard.  Pulmonary/Chest: Effort normal and breath sounds normal. He has no wheezes. He has no rales.   Abdominal: Soft. Bowel sounds are normal. He exhibits no distension. There is tenderness (has had right side).   Musculoskeletal: Normal range of motion. He exhibits no edema.   Neurological: He is alert and oriented to person, place, and time.   Skin: Skin is warm and dry.   Psychiatric: He has a normal mood and affect.      Procedures: none today         Assessment/Plan      Flako was seen today for follow-up and med refill.    Diagnoses and all orders for this visit:    IHD (ischemic heart disease)  -     Stress Test With Myocardial Perfusion One Day; Future  -     Adult Transthoracic Echo Complete W/ Cont if Necessary Per Protocol; Future    Essential hypertension  -     Stress Test With Myocardial Perfusion One Day; Future    Mixed hyperlipidemia  -     Stress Test With Myocardial Perfusion One Day; Future    Nonrheumatic mitral valve regurgitation  -     Adult Transthoracic Echo Complete W/ Cont if Necessary Per Protocol; Future    Shortness of breath  -     Stress Test With Myocardial Perfusion One Day; Future    Abdominal discomfort  -     Stress Test With Myocardial Perfusion One Day; Future      Patient maintains CDL licenses and admits to increased shortness of breath as well as some upper abdominal discomfort. A repeat nuclear stress and echo ordered to reassess cardiac status given he has history of CAD with 80% diagonal being managed medically and stent in his LAD. His blood pressure, heart rate and rhythm are normal. Same cardiac medications advised.     Patient's Body mass index is 26.92 kg/m². BMI is above normal parameters. Recommendations include: nutrition counseling. Mediterranean diet encouraged.     He will follow with you for lab orders/management. Currently, he is taking statin and gemfibrozil without issues. Consider Vascepa if triglycerides not at goal.      Patient does admit to GI  symptoms for which he is following with you for management.     A 6 month follow up visit scheduled. Please call sooner for any cardiac concerns.               Electronically signed by WAQAR Schroeder,  January 2, 2020 9:58 AM

## 2020-01-02 ENCOUNTER — TELEPHONE (OUTPATIENT)
Dept: CARDIOLOGY | Facility: CLINIC | Age: 70
End: 2020-01-02

## 2020-01-02 NOTE — TELEPHONE ENCOUNTER
Bernadette  You ordered stress and echo 12/12/2019.   Stress was denied per [employer] @ scheduling. Do you want to just want to just proceed with echo?

## 2020-01-02 NOTE — TELEPHONE ENCOUNTER
I made addendum to my notes, can we see if they will okay stress testing now? He has to have due to CDLs. Patient denied concerns at visit but he did admit to some symptoms which may qualify for repeat testing due to his history of stenting in the past. I had not added to my first note. Thank you.

## 2020-01-06 ENCOUNTER — TELEPHONE (OUTPATIENT)
Dept: CARDIOLOGY | Facility: CLINIC | Age: 70
End: 2020-01-06

## 2020-01-06 NOTE — TELEPHONE ENCOUNTER
I sent info to [employer] re: stress auth and the addendum to your note.   She said it can not be reevaluated for 45 days.

## 2020-01-06 NOTE — TELEPHONE ENCOUNTER
Can we call patient and inform insurance had initially denied. 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 I am not sure when stress has to be completed for CDL. Do I need to procees another stress test order?

## 2020-02-13 ENCOUNTER — HOSPITAL ENCOUNTER (OUTPATIENT)
Dept: CARDIOLOGY | Facility: HOSPITAL | Age: 70
Discharge: HOME | End: 2020-02-13

## 2020-02-13 VITALS — BODY MASS INDEX: 26.86 KG/M2 | HEIGHT: 70 IN | WEIGHT: 187.61 LBS

## 2020-02-13 DIAGNOSIS — I34.0 NONRHEUMATIC MITRAL VALVE REGURGITATION: ICD-10-CM

## 2020-02-13 DIAGNOSIS — R10.9 ABDOMINAL DISCOMFORT: ICD-10-CM

## 2020-02-13 DIAGNOSIS — I25.9 IHD (ISCHEMIC HEART DISEASE): ICD-10-CM

## 2020-02-13 DIAGNOSIS — I10 ESSENTIAL HYPERTENSION: ICD-10-CM

## 2020-02-13 DIAGNOSIS — E78.2 MIXED HYPERLIPIDEMIA: ICD-10-CM

## 2020-02-13 DIAGNOSIS — R06.02 SHORTNESS OF BREATH: ICD-10-CM

## 2020-02-13 LAB
AORTIC DIMENSIONLESS INDEX: 1 (DI)
BH CV ECHO MEAS - ACS: 1.4 CM
BH CV ECHO MEAS - AO MAX PG (FULL): -0.68 MMHG
BH CV ECHO MEAS - AO MAX PG: 5.6 MMHG
BH CV ECHO MEAS - AO MEAN PG (FULL): 0 MMHG
BH CV ECHO MEAS - AO MEAN PG: 3 MMHG
BH CV ECHO MEAS - AO ROOT AREA (BSA CORRECTED): 1.4
BH CV ECHO MEAS - AO ROOT AREA: 6.2 CM^2
BH CV ECHO MEAS - AO ROOT DIAM: 2.8 CM
BH CV ECHO MEAS - AO V2 MAX: 118 CM/SEC
BH CV ECHO MEAS - AO V2 MEAN: 78.5 CM/SEC
BH CV ECHO MEAS - AO V2 VTI: 25.4 CM
BH CV ECHO MEAS - AVA(I,A): 3.7 CM^2
BH CV ECHO MEAS - AVA(I,D): 3.7 CM^2
BH CV ECHO MEAS - AVA(V,A): 3.7 CM^2
BH CV ECHO MEAS - AVA(V,D): 3.7 CM^2
BH CV ECHO MEAS - BSA(HAYCOCK): 2.1 M^2
BH CV ECHO MEAS - BSA: 2 M^2
BH CV ECHO MEAS - BZI_BMI: 26.8 KILOGRAMS/M^2
BH CV ECHO MEAS - BZI_METRIC_HEIGHT: 177.8 CM
BH CV ECHO MEAS - BZI_METRIC_WEIGHT: 84.8 KG
BH CV ECHO MEAS - EDV(CUBED): 125.8 ML
BH CV ECHO MEAS - EDV(TEICH): 118.8 ML
BH CV ECHO MEAS - EF(CUBED): 71.2 %
BH CV ECHO MEAS - EF(TEICH): 62.6 %
BH CV ECHO MEAS - ESV(CUBED): 36.3 ML
BH CV ECHO MEAS - ESV(TEICH): 44.5 ML
BH CV ECHO MEAS - FS: 33.9 %
BH CV ECHO MEAS - IVS/LVPW: 0.94
BH CV ECHO MEAS - IVSD: 1 CM
BH CV ECHO MEAS - LA DIMENSION(2D): 3.8 CM
BH CV ECHO MEAS - LA DIMENSION: 3.8 CM
BH CV ECHO MEAS - LA/AO: 1.4
BH CV ECHO MEAS - LAT PEAK E' VEL: 8.6 CM/SEC
BH CV ECHO MEAS - LV IVRT: 0.12 SEC
BH CV ECHO MEAS - LV MASS(C)D: 195 GRAMS
BH CV ECHO MEAS - LV MASS(C)DI: 96.1 GRAMS/M^2
BH CV ECHO MEAS - LV MAX PG: 6.3 MMHG
BH CV ECHO MEAS - LV MEAN PG: 3 MMHG
BH CV ECHO MEAS - LV V1 MAX: 125 CM/SEC
BH CV ECHO MEAS - LV V1 MEAN: 69.9 CM/SEC
BH CV ECHO MEAS - LV V1 VTI: 26.8 CM
BH CV ECHO MEAS - LVIDD: 5 CM
BH CV ECHO MEAS - LVIDS: 3.3 CM
BH CV ECHO MEAS - LVOT AREA (M): 3.5 CM^2
BH CV ECHO MEAS - LVOT AREA: 3.5 CM^2
BH CV ECHO MEAS - LVOT DIAM: 2.1 CM
BH CV ECHO MEAS - LVPWD: 1.1 CM
BH CV ECHO MEAS - MED PEAK E' VEL: 6.4 CM/SEC
BH CV ECHO MEAS - MV A MAX VEL: 64.9 CM/SEC
BH CV ECHO MEAS - MV DEC SLOPE: 245 CM/SEC^2
BH CV ECHO MEAS - MV DEC TIME: 0.32 SEC
BH CV ECHO MEAS - MV E MAX VEL: 49.1 CM/SEC
BH CV ECHO MEAS - MV E/A: 0.76
BH CV ECHO MEAS - MV MAX PG: 2.2 MMHG
BH CV ECHO MEAS - MV MEAN PG: 1 MMHG
BH CV ECHO MEAS - MV P1/2T MAX VEL: 71.5 CM/SEC
BH CV ECHO MEAS - MV P1/2T: 85.5 MSEC
BH CV ECHO MEAS - MV V2 MAX: 74.6 CM/SEC
BH CV ECHO MEAS - MV V2 MEAN: 45.5 CM/SEC
BH CV ECHO MEAS - MV V2 VTI: 25.4 CM
BH CV ECHO MEAS - MVA P1/2T LCG: 3.1 CM^2
BH CV ECHO MEAS - MVA(P1/2T): 2.6 CM^2
BH CV ECHO MEAS - MVA(VTI): 3.7 CM^2
BH CV ECHO MEAS - PA MAX PG (FULL): 3.5 MMHG
BH CV ECHO MEAS - PA MAX PG: 7 MMHG
BH CV ECHO MEAS - PA MEAN PG (FULL): 2 MMHG
BH CV ECHO MEAS - PA MEAN PG: 4 MMHG
BH CV ECHO MEAS - PA V2 MAX: 132 CM/SEC
BH CV ECHO MEAS - PA V2 MEAN: 85.1 CM/SEC
BH CV ECHO MEAS - PA V2 VTI: 30.5 CM
BH CV ECHO MEAS - RAP SYSTOLE: 10 MMHG
BH CV ECHO MEAS - RV MAX PG: 3.5 MMHG
BH CV ECHO MEAS - RV MEAN PG: 2 MMHG
BH CV ECHO MEAS - RV V1 MAX: 93.3 CM/SEC
BH CV ECHO MEAS - RV V1 MEAN: 60.4 CM/SEC
BH CV ECHO MEAS - RV V1 VTI: 26 CM
BH CV ECHO MEAS - RVDD: 2.6 CM
BH CV ECHO MEAS - RVSP: 18 MMHG
BH CV ECHO MEAS - SI(AO): 77.1 ML/M^2
BH CV ECHO MEAS - SI(CUBED): 44.1 ML/M^2
BH CV ECHO MEAS - SI(LVOT): 45.8 ML/M^2
BH CV ECHO MEAS - SI(TEICH): 36.6 ML/M^2
BH CV ECHO MEAS - SV(AO): 156.4 ML
BH CV ECHO MEAS - SV(CUBED): 89.5 ML
BH CV ECHO MEAS - SV(LVOT): 92.8 ML
BH CV ECHO MEAS - SV(TEICH): 74.3 ML
BH CV ECHO MEAS - TAPSE (>1.6): 2.2 CM2
BH CV ECHO MEAS - TR MAX VEL: 141 CM/SEC
BH CV ECHO MEASUREMENTS AVERAGE E/E' RATIO: 6.55
BH CV NUCLEAR PRIOR STUDY: 1
BH CV STRESS COMMENTS STAGE 1: NORMAL
BH CV STRESS DOSE REGADENOSON STAGE 1: 0.4
BH CV STRESS DURATION MIN STAGE 1: 0
BH CV STRESS DURATION SEC STAGE 1: 10
BH CV STRESS PROTOCOL 1: NORMAL
BH CV STRESS RECOVERY BP: NORMAL MMHG
BH CV STRESS RECOVERY HR: 80 BPM
BH CV STRESS STAGE 1: 1
LV EF NUC BP: 45 %
MAXIMAL PREDICTED HEART RATE: 152 BPM
MAXIMAL PREDICTED HEART RATE: 152 BPM
PERCENT MAX PREDICTED HR: 72.37 %
STRESS BASELINE BP: NORMAL MMHG
STRESS BASELINE HR: 62 BPM
STRESS PERCENT HR: 85 %
STRESS POST PEAK BP: NORMAL MMHG
STRESS POST PEAK HR: 110 BPM
STRESS TARGET HR: 129 BPM
STRESS TARGET HR: 129 BPM

## 2020-02-13 PROCEDURE — 0 TECHNETIUM SESTAMIBI: Performed by: INTERNAL MEDICINE

## 2020-02-13 PROCEDURE — 93306 TTE W/DOPPLER COMPLETE: CPT

## 2020-02-13 PROCEDURE — 93017 CV STRESS TEST TRACING ONLY: CPT

## 2020-02-13 PROCEDURE — 78452 HT MUSCLE IMAGE SPECT MULT: CPT | Performed by: INTERNAL MEDICINE

## 2020-02-13 PROCEDURE — 93306 TTE W/DOPPLER COMPLETE: CPT | Performed by: INTERNAL MEDICINE

## 2020-02-13 PROCEDURE — 78452 HT MUSCLE IMAGE SPECT MULT: CPT

## 2020-02-13 PROCEDURE — A9500 TC99M SESTAMIBI: HCPCS | Performed by: INTERNAL MEDICINE

## 2020-02-13 PROCEDURE — 93016 CV STRESS TEST SUPVJ ONLY: CPT | Performed by: NURSE PRACTITIONER

## 2020-02-13 PROCEDURE — 93018 CV STRESS TEST I&R ONLY: CPT | Performed by: INTERNAL MEDICINE

## 2020-02-13 RX ADMIN — TECHNETIUM TC 99M SESTAMIBI 1 DOSE: 1 INJECTION INTRAVENOUS at 09:56

## 2020-02-13 RX ADMIN — TECHNETIUM TC 99M SESTAMIBI 1 DOSE: 1 INJECTION INTRAVENOUS at 12:09

## 2020-02-14 RX ORDER — AMLODIPINE BESYLATE 5 MG/1
5 TABLET ORAL DAILY
Qty: 30 TABLET | Refills: 11 | Status: SHIPPED | OUTPATIENT
Start: 2020-02-14 | End: 2020-06-29 | Stop reason: ALTCHOICE

## 2020-06-28 NOTE — PROGRESS NOTES
"Chief Complaint   Patient presents with   • Follow-up     Cardiac management. Was in hospital 2/2020, states \"was loosing blood, had EGD and Colonoscopy that was normal, was told to stop taking Plavix\".  He reports has been taking plavix every other day per himself. PCP decreased Lisinopril to once daily for low B/P.   • Lab     Has copy of most recent labs per PCP.   • Med Refill     Needs refills on cardiac medications-90 day.     Subjective       Flako Mcfarland is a 69 y.o. male with HTN, hyperlipidemia, DM, and IHD diagnosed in 2004 when he underwent stenting. Cardiac cath in 2009 showed stent in LAD was patent and 80% disease of the diagonal which has been managed medically. Stress in January 2016 showed only fixed defect, medical management continued. 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 Stress showed anterior infarct without ischemia. LVEF 55-60% and RVSP 36 mmHg. Stress test repeated 2/13/20 showed mild hypertensive response, small apical infarct with no ischemia. Amlodipine 5 mg added.     He came today for regular follow-up.  He did not add amlodipine secondary to resting blood pressure low-normal.  BP usually 108/60.  He was afraid taking amlodipine would make him weak or dizzy.  He denies having any chest pain, palpitations, shortness of breath.  He was hospitalized February 2020 after noticing dark, black stool which lasted 1 to 2 weeks.  He underwent upper and lower endoscopy unable to find source of bleed.  He was advised to stop Plavix.  He was afraid to remain off Plavix as he has been told in the past would need for the rest of his life.  After couple weeks, he resumed Plavix every other day and has had no further bleeding.  Labs brought today showed H/H 12.4/37.8, RDW 18.1, glucose 182, GFR greater than 60, , , HDL 35, LDL 72, TSH 2.44, A1C 9.0%, B12 175 for which he now receives injections monthly.  HPI     "     Cardiac History:    Past Surgical History:   Procedure Laterality Date   • CARDIAC CATHETERIZATION  2004    MI & One stent.   • CARDIAC CATHETERIZATION  11/09/2009    Patent stent in the LAD.  80% D2.   • CARDIOVASCULAR STRESS TEST  04/14/2008    S. Echo- 9 min, 30 sec., 80% THR. Inferior infarct.   • CARDIOVASCULAR STRESS TEST  10/15/2009    9 min. 84% /96 Inferoseptal infarct with lani-infarct ischemia.   • CARDIOVASCULAR STRESS TEST  01/11/2013    9 min, 81% THR, 182/80 Large infarct with  mild lani-infarct ischemia.   • CARDIOVASCULAR STRESS TEST  01/14/2016    9 Min, 32 Secs.83% THR. EF 54%. Anteroseptal Infarct   • CARDIOVASCULAR STRESS TEST  02/27/2018    10 Min, 83% THR. 12.2 Mets. EF 57%. Anterior Infarct,.   • CARDIOVASCULAR STRESS TEST  02/13/2020    10 Min. 10.1 METS. 84% THR. BP- 179/79. EF 45%. Apoical Infarct.   • ECHO - CONVERTED  02/29/2008    EF 60%, RVSP- 42mmHg   • ECHO - CONVERTED  10/15/2009    EF 55% Inferoseptal WMA. RVSP- 37mmHg   • ECHO - CONVERTED  01/11/2013    EF 50-55%, RVSP 40 mmHg.   • ECHO - CONVERTED  01/14/2016    EF 50-55%, mild MR, mod pulm HTN   • ECHO - CONVERTED  02/27/2018    EF 55%. RVSP- 36 mmHg   • ECHO - CONVERTED  02/13/2020    EF 45-50%. Apical WMA. Mild MR. RVSP- 18 mmHg.       Current Outpatient Medications   Medication Sig Dispense Refill   • aspirin 81 MG EC tablet Take 81 mg by mouth Daily.     • clopidogrel (PLAVIX) 75 MG tablet Take 75 mg by mouth Every Other Day.     • Dulaglutide (TRULICITY) 1.5 MG/0.5ML solution pen-injector Inject  under the skin 1 (One) Time Per Week.     • gemfibrozil (LOPID) 600 MG tablet TAKE ONE TABLET BY MOUTH DAILY 30 tablet 11   • glyBURIDE (DIAbeta) 5 MG tablet Take 10 mg by mouth 2 (Two) Times a Day With Meals.     • isosorbide mononitrate (IMDUR) 30 MG 24 hr tablet TAKE ONE TABLET BY MOUTH EVERY MORNING 30 tablet 8   • Linagliptin (TRADJENTA PO) Take  by mouth.     • lisinopril (PRINIVIL,ZESTRIL) 10 MG tablet Take 10 mg  by mouth Daily.     • metFORMIN (GLUCOPHAGE) 500 MG tablet Take 2 tabs twice daily      • metoprolol succinate XL (TOPROL-XL) 25 MG 24 hr tablet TAKE ONE TABLET BY MOUTH DAILY 30 tablet 11   • nitroglycerin (NITROSTAT) 0.4 MG SL tablet 1 under the tongue as needed for angina, may repeat q5mins for up three doses 25 tablet 6   • rosuvastatin (CRESTOR) 20 MG tablet Take 1 tablet by mouth Daily. 30 tablet 12   • clopidogrel (PLAVIX) 75 MG tablet TAKE ONE TABLET BY MOUTH DAILY 30 tablet 11     No current facility-administered medications for this visit.      Patient has no known allergies.    Past Medical History:   Diagnosis Date   • Arthritis    • Diabetes mellitus (CMS/HCC)    • Hyperlipidemia    • Hypertension    • IHD (ischemic heart disease)     Chronic   • Mitral valve insufficiency      Social History     Socioeconomic History   • Marital status:      Spouse name: Not on file   • Number of children: Not on file   • Years of education: Not on file   • Highest education level: Not on file   Tobacco Use   • Smoking status: Former Smoker     Last attempt to quit: 2004     Years since quittin.0   • Smokeless tobacco: Never Used   Substance and Sexual Activity   • Alcohol use: Yes     Comment: Occasional glass of wine   • Drug use: No       Family History   Problem Relation Age of Onset   • Hypertension Mother    • Hyperlipidemia Mother    • Heart disease Father      Review of Systems   Constitution: Positive for weight loss (Down 1 LB). Negative for decreased appetite and malaise/fatigue.   HENT: Negative.    Eyes: Negative.    Cardiovascular: Negative for chest pain, dyspnea on exertion, leg swelling, palpitations and syncope.   Respiratory: Negative for cough and shortness of breath.    Endocrine: Negative.    Hematologic/Lymphatic: Negative.    Skin: Negative.    Musculoskeletal: Positive for arthritis, neck pain (Left neck and shoulder) and stiffness.   Gastrointestinal: Negative for abdominal pain  "and melena (None since February).   Genitourinary: Negative for frequency and hematuria.   Neurological: Negative for dizziness.   Psychiatric/Behavioral: Negative for altered mental status and depression.   Allergic/Immunologic: Negative.       Diabetes- Yes  Thyroid-normal    Objective     /70 (BP Location: Right arm)   Pulse 56   Temp 98 °F (36.7 °C)   Ht 178 cm (70.08\")   Wt 84.6 kg (186 lb 6.4 oz)   BMI 26.69 kg/m²     Physical Exam   Constitutional: He is oriented to person, place, and time. He appears well-developed and well-nourished. No distress.   HENT:   Head: Normocephalic and atraumatic.   Eyes: Pupils are equal, round, and reactive to light.   Neck: Normal range of motion.   Cardiovascular: Normal rate, regular rhythm and intact distal pulses.   No murmur heard.  Pulmonary/Chest: Effort normal and breath sounds normal. No respiratory distress.   Abdominal: Soft. Bowel sounds are normal. He exhibits no distension.   Musculoskeletal: Normal range of motion. He exhibits no edema.   Neurological: He is alert and oriented to person, place, and time.   Skin: Skin is warm and dry. He is not diaphoretic.   Psychiatric: He has a normal mood and affect.   Nursing note and vitals reviewed.     Procedures          Assessment/Plan      Flako was seen today for follow-up, lab and med refill.    Diagnoses and all orders for this visit:    IHD (ischemic heart disease)    Essential hypertension    Mixed hyperlipidemia    Type 2 diabetes mellitus with other specified complication, without long-term current use of insulin (CMS/[facility])      1.  IHD-status post stenting in 2004 with no restenosis noted.  Diagonal has been managed medically due to small vessel size.  Continue aspirin and Plavix as long as he has no recurrent bleed.  Continue beta-blocker and statin.  We reviewed the findings of his stress test done in February which showed small infarct with no ischemia.  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  He is advised to contact office should he develop any chest tightness with exertion    2.  HTN-Well-controlled with lisinopril and metoprolol.  Limit daily sodium intake.  BMI borderline elevated.  He does follow heart healthy diet.    3.  Hyperlipidemia-well controlled with Crestor and gemfibrozil with total cholesterol 133 and triglycerides 130.  Continue the same.    4.  Diabetes-poorly controlled with A1C 9%.  He is following a much stricter diet reducing his daily carbohydrate intake.  He plans to follow with primary care for next labs.  We discussed diet in detail.      He appears stable from a cardiac standpoint.  We will see him back in 6 months for follow-up or sooner if needed.    Patient's Body mass index is 26.69 kg/m². BMI is above normal parameters. Recommendations include: nutrition counseling.               Electronically signed by WAQAR Bo,  June 29, 2020 08:30

## 2020-06-29 ENCOUNTER — OFFICE VISIT (OUTPATIENT)
Dept: CARDIOLOGY | Facility: CLINIC | Age: 70
End: 2020-06-29

## 2020-06-29 VITALS
TEMPERATURE: 98 F | HEART RATE: 56 BPM | WEIGHT: 186.4 LBS | HEIGHT: 70 IN | SYSTOLIC BLOOD PRESSURE: 120 MMHG | BODY MASS INDEX: 26.69 KG/M2 | DIASTOLIC BLOOD PRESSURE: 70 MMHG

## 2020-06-29 DIAGNOSIS — I10 ESSENTIAL HYPERTENSION: ICD-10-CM

## 2020-06-29 DIAGNOSIS — I25.9 IHD (ISCHEMIC HEART DISEASE): Primary | ICD-10-CM

## 2020-06-29 DIAGNOSIS — E11.69 TYPE 2 DIABETES MELLITUS WITH OTHER SPECIFIED COMPLICATION, WITHOUT LONG-TERM CURRENT USE OF INSULIN (HCC): ICD-10-CM

## 2020-06-29 DIAGNOSIS — E78.2 MIXED HYPERLIPIDEMIA: ICD-10-CM

## 2020-06-29 PROCEDURE — 99214 OFFICE O/P EST MOD 30 MIN: CPT | Performed by: NURSE PRACTITIONER

## 2020-06-29 RX ORDER — GEMFIBROZIL 600 MG/1
600 TABLET, FILM COATED ORAL DAILY
Qty: 90 TABLET | Refills: 3 | Status: SHIPPED | OUTPATIENT
Start: 2020-06-29 | End: 2021-05-21 | Stop reason: SDUPTHER

## 2020-06-29 RX ORDER — ISOSORBIDE MONONITRATE 30 MG/1
30 TABLET, EXTENDED RELEASE ORAL EVERY MORNING
Qty: 90 TABLET | Refills: 3 | Status: SHIPPED | OUTPATIENT
Start: 2020-06-29 | End: 2021-05-21

## 2020-06-29 RX ORDER — LISINOPRIL 10 MG/1
10 TABLET ORAL DAILY
Qty: 90 TABLET | Refills: 3 | Status: SHIPPED | OUTPATIENT
Start: 2020-06-29 | End: 2021-05-21

## 2020-06-29 RX ORDER — CLOPIDOGREL BISULFATE 75 MG/1
75 TABLET ORAL EVERY OTHER DAY
COMMUNITY
End: 2020-06-29 | Stop reason: SDUPTHER

## 2020-06-29 RX ORDER — CLOPIDOGREL BISULFATE 75 MG/1
75 TABLET ORAL EVERY OTHER DAY
Qty: 90 TABLET | Refills: 3 | Status: SHIPPED | OUTPATIENT
Start: 2020-06-29 | End: 2021-05-21

## 2020-06-29 RX ORDER — ROSUVASTATIN CALCIUM 20 MG/1
20 TABLET, COATED ORAL DAILY
Qty: 90 TABLET | Refills: 3 | Status: SHIPPED | OUTPATIENT
Start: 2020-06-29 | End: 2021-05-21 | Stop reason: SDUPTHER

## 2020-11-05 RX ORDER — METOPROLOL SUCCINATE 25 MG/1
TABLET, EXTENDED RELEASE ORAL
Qty: 30 TABLET | Refills: 10 | Status: SHIPPED | OUTPATIENT
Start: 2020-11-05 | End: 2021-05-21 | Stop reason: SDUPTHER

## 2021-05-20 NOTE — PROGRESS NOTES
"Chief Complaint   Patient presents with   • Follow-up     Cardiac management   • Lab     Has copy of most recent labs.   • Fatigue     Feeling tired and no energy for the last week. He states \"my stool is black\".  He stopped taking aspirin and plavix this week, no change in stool color. He has held iron this week to check stool color with no changes noted.   • Appetite     Has no appetite, not eating well.   • Shortness of Breath     Started this week, having with minimal exertion.   • Dizziness     Only notices when standing up.   • Med Refill     Needs refills on all cardiac medications-90 day.   • Blood pressure     Has been low, 92/56.       Subjective       Flako Mcfarland is a 70 y.o. male with HTN, hyperlipidemia, DM, and IHD diagnosed in 2004 when he underwent stenting. Cardiac cath in 2009 showed stent in LAD was patent and 80% disease of the diagonal which has been managed medically. Stress in January 2016 showed only fixed defect, medical management continued. He has [employer] licenses and at his 2/15/18 office visit, repeat cardiac workup ordered per [employer] guidelines. Stress showed anterior infarct without ischemia. LVEF 55-60% and RVSP 36 mmHg. Stress test repeated 2/13/20 showed mild hypertensive response, small apical infarct with no ischemia. Amlodipine 5 mg added.     Today he returns to the office due to weakness and lower blood pressure.  Recently he has had a decrease in appetite but no nausea or vomiting.  He has been on iron supplements and having dark stool but characteristics have recently changed being suspicious for GI bleeding.  He did have labs drawn yesterday with notable decrease in H&H.  He has stopped Plavix and aspirin 3 days ago.    Cardiac History:    Past Surgical History:   Procedure Laterality Date   • CARDIAC CATHETERIZATION  2004    MI & One stent.   • CARDIAC CATHETERIZATION  11/09/2009    Patent stent in the LAD.  80% D2.   • CARDIOVASCULAR STRESS TEST  04/14/2008    S. Echo- 9 " min, 30 sec., 80% THR. Inferior infarct.   • CARDIOVASCULAR STRESS TEST  10/15/2009    9 min. 84% /96 Inferoseptal infarct with lani-infarct ischemia.   • CARDIOVASCULAR STRESS TEST  01/11/2013    9 min, 81% THR, 182/80 Large infarct with  mild lani-infarct ischemia.   • CARDIOVASCULAR STRESS TEST  01/14/2016    9 Min, 32 Secs.83% THR. EF 54%. Anteroseptal Infarct   • CARDIOVASCULAR STRESS TEST  02/27/2018    10 Min, 83% THR. 12.2 Mets. EF 57%. Anterior Infarct,.   • CARDIOVASCULAR STRESS TEST  02/13/2020    10 Min. 10.1 METS. 84% THR. BP- 179/79. EF 45%. Apoical Infarct.   • ECHO - CONVERTED  02/29/2008    EF 60%, RVSP- 42mmHg   • ECHO - CONVERTED  10/15/2009    EF 55% Inferoseptal WMA. RVSP- 37mmHg   • ECHO - CONVERTED  01/11/2013    EF 50-55%, RVSP 40 mmHg.   • ECHO - CONVERTED  01/14/2016    EF 50-55%, mild MR, mod pulm HTN   • ECHO - CONVERTED  02/27/2018    EF 55%. RVSP- 36 mmHg   • ECHO - CONVERTED  02/13/2020    EF 45-50%. Apical WMA. Mild MR. RVSP- 18 mmHg.       Current Outpatient Medications   Medication Sig Dispense Refill   • Cyanocobalamin 1000 MCG/ML kit Inject  as directed Every 30 (Thirty) Days.     • Dulaglutide (TRULICITY) 1.5 MG/0.5ML solution pen-injector Inject  under the skin 1 (One) Time Per Week.     • ferrous sulfate 324 (65 Fe) MG tablet delayed-release EC tablet Take 324 mg by mouth Daily With Breakfast.     • gemfibrozil (LOPID) 600 MG tablet Take 1 tablet by mouth Daily. 90 tablet 3   • glyBURIDE (DIAbeta) 5 MG tablet Take 10 mg by mouth 2 (Two) Times a Day With Meals.     • isosorbide mononitrate (IMDUR) 30 MG 24 hr tablet Decrease to 1/2 tablet daily until blood pressure returns to normal. 90 tablet 3   • Linagliptin (TRADJENTA PO) Take  by mouth Daily.     • metFORMIN (GLUCOPHAGE) 500 MG tablet Take 2 tabs twice daily      • metoprolol succinate XL (TOPROL-XL) 25 MG 24 hr tablet TAKE ONE TABLET BY MOUTH DAILY 30 tablet 10   • nitroglycerin (NITROSTAT) 0.4 MG SL tablet 1 under  the tongue as needed for angina, may repeat q5mins for up three doses 25 tablet 6   • rosuvastatin (CRESTOR) 20 MG tablet Take 1 tablet by mouth Daily. 90 tablet 3     No current facility-administered medications for this visit.       Patient has no known allergies.    Past Medical History:   Diagnosis Date   • Arthritis    • Diabetes mellitus (CMS/HCC)    • Hyperlipidemia    • Hypertension    • IHD (ischemic heart disease)     Chronic   • Mitral valve insufficiency        Social History     Socioeconomic History   • Marital status:      Spouse name: Not on file   • Number of children: Not on file   • Years of education: Not on file   • Highest education level: Not on file   Tobacco Use   • Smoking status: Former Smoker     Quit date: 2004     Years since quittin.9   • Smokeless tobacco: Never Used   Vaping Use   • Vaping Use: Never used   Substance and Sexual Activity   • Alcohol use: Yes     Comment: Occasional glass of wine   • Drug use: No       Family History   Problem Relation Age of Onset   • Hypertension Mother    • Hyperlipidemia Mother    • Heart disease Father        Review of Systems   Constitutional: Positive for decreased appetite, malaise/fatigue and weight loss. Negative for diaphoresis.   HENT: Negative for nosebleeds.    Eyes: Negative for blurred vision.   Cardiovascular: Negative for chest pain, claudication, cyanosis, dyspnea on exertion, irregular heartbeat, leg swelling, near-syncope, orthopnea, palpitations, paroxysmal nocturnal dyspnea and syncope.   Respiratory: Positive for shortness of breath.    Endocrine: Negative for cold intolerance and heat intolerance.   Hematologic/Lymphatic: Does not bruise/bleed easily.   Skin: Negative for rash.   Musculoskeletal: Negative for myalgias.   Gastrointestinal: Positive for abdominal pain (right mid abdomen) and melena. Negative for heartburn and nausea.   Genitourinary: Negative for dysuria and hematuria.   Neurological: Positive for  "weakness. Negative for dizziness and light-headedness.   Psychiatric/Behavioral: The patient does not have insomnia and is not nervous/anxious.         BP Readings from Last 5 Encounters:   05/21/21 (!) 88/52   06/29/20 120/70   12/12/19 120/70   03/18/19 90/60   09/17/18 138/82       Wt Readings from Last 5 Encounters:   05/21/21 79.5 kg (175 lb 3.2 oz)   06/29/20 84.6 kg (186 lb 6.4 oz)   02/13/20 85.1 kg (187 lb 9.8 oz)   12/12/19 85.1 kg (187 lb 9.6 oz)   03/18/19 86.2 kg (190 lb)       Objective      Labs 5/21: Glucose 162, BUN 40, creatinine 1.12, GFR greater than 60, sodium 133, potassium 3.9, chloride 105, CO2 22, calcium 9.5, total protein 5.9, albumin 3.9, total bili 0.6, AST 17, ALT 17, ALP 37, WBC 10, RBC 3.29, hemoglobin 10.1, hematocrit 29.9, platelets 274    H/H 12.4/37.8, RDW 18.1, glucose 182, GFR greater than 60, , , HDL 35, LDL 72, TSH 2.44, A1C 9.0%, B12 175     BP (!) 88/52 (BP Location: Left arm)   Pulse 64   Ht 178 cm (70.08\")   Wt 79.5 kg (175 lb 3.2 oz)   BMI 25.08 kg/m²     Vitals and nursing note reviewed.   Constitutional:       Appearance: Not in distress.   Eyes:      Pupils: Pupils are equal, round, and reactive to light.   HENT:      Head: Normocephalic.   Neck:      Vascular: No carotid bruit.   Pulmonary:      Effort: Pulmonary effort is normal.      Breath sounds: Normal breath sounds. No wheezing. No rales.   Cardiovascular:      Normal rate. Regular rhythm.   Edema:     Peripheral edema absent.   Abdominal:      General: Bowel sounds are normal.      Palpations: Abdomen is soft.   Musculoskeletal: Normal range of motion.      Cervical back: Normal range of motion. Skin:     General: Skin is warm.      Coloration: Skin is pale.   Neurological:      Mental Status: Alert, oriented to person, place, and time and oriented to person, place and time.          Procedures: none today          Assessment/Plan   Diagnoses and all orders for this visit:    1. Anemia, " unspecified type (Primary)    2. Other fatigue    3. Shortness of breath    4. IHD (ischemic heart disease)  -     isosorbide mononitrate (IMDUR) 30 MG 24 hr tablet; Decrease to 1/2 tablet daily until blood pressure returns to normal.  Dispense: 90 tablet; Refill: 3    5. Essential hypertension    6. Mixed hyperlipidemia    7. Type 2 diabetes mellitus with other specified complication, without long-term current use of insulin (CMS/[facility])      Patient presents today with signs of anemia including recent decrease in H&H.  I advised him to follow-up with you today in regards.  Agree with holding Plavix and aspirin at this time.  Due to hypotension advised to hold lisinopril and decrease Imdur to 1/2 tablet daily.  Continue to monitor blood pressure.  BUN is elevated and sodium slightly low.  I encouraged him on drinking diabetic Gatorade.    Currently his heart rate and rhythm are normal.  No cardiac testing warranted at this time.  If symptoms persist despite improvement of anemia then we will consider repeat cardiac work-up.    Patient's Body mass index is 25.08 kg/m². indicating that he is overweight (BMI 25-29.9). Obesity-related health conditions include the following: coronary heart disease. Obesity is improved. BMI is is above average; BMI management plan is completed. We discussed low calorie, low carb based diet program. Patient admits to decreased appetite as causative factor for weight loss. He will follow up with you in regards.      A routine follow-up visit scheduled.  Please call sooner for cardiac concerns.           Electronically signed by WAQAR Schroeder,  May 21, 2021 10:11 EDT

## 2021-05-21 ENCOUNTER — OFFICE VISIT (OUTPATIENT)
Dept: CARDIOLOGY | Facility: CLINIC | Age: 71
End: 2021-05-21

## 2021-05-21 VITALS
BODY MASS INDEX: 25.08 KG/M2 | SYSTOLIC BLOOD PRESSURE: 88 MMHG | WEIGHT: 175.2 LBS | DIASTOLIC BLOOD PRESSURE: 52 MMHG | HEIGHT: 70 IN | HEART RATE: 64 BPM

## 2021-05-21 DIAGNOSIS — I10 ESSENTIAL HYPERTENSION: ICD-10-CM

## 2021-05-21 DIAGNOSIS — D64.9 ANEMIA, UNSPECIFIED TYPE: Primary | ICD-10-CM

## 2021-05-21 DIAGNOSIS — R53.83 OTHER FATIGUE: ICD-10-CM

## 2021-05-21 DIAGNOSIS — E78.2 MIXED HYPERLIPIDEMIA: ICD-10-CM

## 2021-05-21 DIAGNOSIS — E11.69 TYPE 2 DIABETES MELLITUS WITH OTHER SPECIFIED COMPLICATION, WITHOUT LONG-TERM CURRENT USE OF INSULIN (HCC): ICD-10-CM

## 2021-05-21 DIAGNOSIS — R06.02 SHORTNESS OF BREATH: ICD-10-CM

## 2021-05-21 DIAGNOSIS — I25.9 IHD (ISCHEMIC HEART DISEASE): ICD-10-CM

## 2021-05-21 PROCEDURE — 99214 OFFICE O/P EST MOD 30 MIN: CPT | Performed by: NURSE PRACTITIONER

## 2021-05-21 RX ORDER — METOPROLOL SUCCINATE 25 MG/1
25 TABLET, EXTENDED RELEASE ORAL DAILY
Qty: 90 TABLET | Refills: 2 | Status: SHIPPED | OUTPATIENT
Start: 2021-05-21 | End: 2022-01-31 | Stop reason: SDUPTHER

## 2021-05-21 RX ORDER — FERROUS SULFATE TAB EC 324 MG (65 MG FE EQUIVALENT) 324 (65 FE) MG
324 TABLET DELAYED RESPONSE ORAL
COMMUNITY

## 2021-05-21 RX ORDER — ISOSORBIDE MONONITRATE 30 MG/1
TABLET, EXTENDED RELEASE ORAL
Qty: 90 TABLET | Refills: 2
Start: 2021-05-21 | End: 2021-07-09

## 2021-05-21 RX ORDER — GEMFIBROZIL 600 MG/1
600 TABLET, FILM COATED ORAL DAILY
Qty: 90 TABLET | Refills: 2 | Status: SHIPPED | OUTPATIENT
Start: 2021-05-21 | End: 2022-01-31 | Stop reason: SDUPTHER

## 2021-05-21 RX ORDER — ROSUVASTATIN CALCIUM 20 MG/1
20 TABLET, COATED ORAL DAILY
Qty: 90 TABLET | Refills: 2 | Status: SHIPPED | OUTPATIENT
Start: 2021-05-21 | End: 2022-01-31 | Stop reason: SDUPTHER

## 2021-05-21 RX ORDER — ISOSORBIDE MONONITRATE 30 MG/1
TABLET, EXTENDED RELEASE ORAL
Qty: 90 TABLET | Refills: 3
Start: 2021-05-21 | End: 2021-05-21 | Stop reason: SDUPTHER

## 2021-07-07 DIAGNOSIS — I25.9 IHD (ISCHEMIC HEART DISEASE): ICD-10-CM

## 2021-07-08 RX ORDER — LISINOPRIL 10 MG/1
TABLET ORAL
Qty: 30 TABLET | Refills: 2 | OUTPATIENT
Start: 2021-07-08

## 2021-07-09 RX ORDER — ISOSORBIDE MONONITRATE 30 MG/1
15 TABLET, EXTENDED RELEASE ORAL DAILY
Qty: 15 TABLET | Refills: 2 | Status: SHIPPED | OUTPATIENT
Start: 2021-07-09 | End: 2021-10-11

## 2021-07-09 RX ORDER — CLOPIDOGREL BISULFATE 75 MG/1
TABLET ORAL
Qty: 15 TABLET | Refills: 2 | OUTPATIENT
Start: 2021-07-09

## 2021-10-06 DIAGNOSIS — I25.9 IHD (ISCHEMIC HEART DISEASE): ICD-10-CM

## 2021-10-11 RX ORDER — ISOSORBIDE MONONITRATE 30 MG/1
TABLET, EXTENDED RELEASE ORAL
Qty: 15 TABLET | Refills: 2 | Status: SHIPPED | OUTPATIENT
Start: 2021-10-11 | End: 2022-01-10

## 2022-01-09 DIAGNOSIS — I25.9 IHD (ISCHEMIC HEART DISEASE): ICD-10-CM

## 2022-01-10 RX ORDER — ISOSORBIDE MONONITRATE 30 MG/1
TABLET, EXTENDED RELEASE ORAL
Qty: 15 TABLET | Refills: 2 | Status: SHIPPED | OUTPATIENT
Start: 2022-01-10 | End: 2022-04-11

## 2022-01-31 ENCOUNTER — OFFICE VISIT (OUTPATIENT)
Dept: CARDIOLOGY | Facility: CLINIC | Age: 72
End: 2022-01-31

## 2022-01-31 VITALS
WEIGHT: 186 LBS | DIASTOLIC BLOOD PRESSURE: 72 MMHG | HEART RATE: 60 BPM | BODY MASS INDEX: 26.63 KG/M2 | TEMPERATURE: 97.7 F | HEIGHT: 70 IN | SYSTOLIC BLOOD PRESSURE: 138 MMHG

## 2022-01-31 DIAGNOSIS — E11.69 TYPE 2 DIABETES MELLITUS WITH OTHER SPECIFIED COMPLICATION, WITHOUT LONG-TERM CURRENT USE OF INSULIN: ICD-10-CM

## 2022-01-31 DIAGNOSIS — I25.9 IHD (ISCHEMIC HEART DISEASE): Primary | ICD-10-CM

## 2022-01-31 DIAGNOSIS — I10 PRIMARY HYPERTENSION: ICD-10-CM

## 2022-01-31 DIAGNOSIS — E78.2 MIXED HYPERLIPIDEMIA: ICD-10-CM

## 2022-01-31 DIAGNOSIS — R53.83 OTHER FATIGUE: ICD-10-CM

## 2022-01-31 PROCEDURE — 99214 OFFICE O/P EST MOD 30 MIN: CPT | Performed by: NURSE PRACTITIONER

## 2022-01-31 RX ORDER — CLOPIDOGREL BISULFATE 75 MG/1
75 TABLET ORAL EVERY OTHER DAY
COMMUNITY
End: 2022-08-29 | Stop reason: SDUPTHER

## 2022-01-31 RX ORDER — ROSUVASTATIN CALCIUM 20 MG/1
20 TABLET, COATED ORAL DAILY
Qty: 90 TABLET | Refills: 2 | Status: SHIPPED | OUTPATIENT
Start: 2022-01-31 | End: 2022-12-27

## 2022-01-31 RX ORDER — GEMFIBROZIL 600 MG/1
600 TABLET, FILM COATED ORAL DAILY
Qty: 90 TABLET | Refills: 2 | Status: SHIPPED | OUTPATIENT
Start: 2022-01-31 | End: 2022-11-07

## 2022-01-31 RX ORDER — SUCRALFATE ORAL 1 G/10ML
SUSPENSION ORAL
Qty: 420 ML | Refills: 1 | Status: SHIPPED | OUTPATIENT
Start: 2022-01-31

## 2022-01-31 RX ORDER — METOPROLOL SUCCINATE 25 MG/1
25 TABLET, EXTENDED RELEASE ORAL DAILY
Qty: 90 TABLET | Refills: 2 | Status: SHIPPED | OUTPATIENT
Start: 2022-01-31 | End: 2022-11-07

## 2022-01-31 NOTE — PROGRESS NOTES
Chief Complaint   Patient presents with   • Follow-up     Cardiac management   • LABS     Has copy of labs December 2021   • Heartburn     Has heartburn occasionally , he said he has been prescribed Carafate at one time which helped , but he is now outof it.   • Med Refill     Needs refills on  Lopid, Metoprolol,  Crestor 90 day supply to [facility] pharmacy       Subjective       Flako Mcfarland is a 71 y.o. male with HTN, hyperlipidemia, DM, and IHD diagnosed in 2004 when he underwent stenting. Cardiac cath in 2009 showed stent in LAD was patent and 80% disease of the diagonal which has been managed medically. Stress in January 2016 showed only fixed defect, medical management continued. 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 Stress showed anterior infarct without ischemia. LVEF 55-60% and RVSP 36 mmHg. Stress test repeated 2/13/20 showed mild hypertensive response, small apical infarct with no ischemia. Amlodipine 5 mg added.     In May 2021 he had decrease in H&H for which Plavix and aspirin were stopped and GI work-up advised.  After taking Carafate his GI symptoms resolved.  H&H improved.    Today he returns to the office for a follow-up visit.  He is maintaining his normal daily activities but states he does not have the energy to do what he is done in the past.  No recent chest pain, palpitations, dizziness, edema or increase shortness of breath noted.     Cardiac History:    Past Surgical History:   Procedure Laterality Date   • CARDIAC CATHETERIZATION  2004    MI & One stent.   • CARDIAC CATHETERIZATION  11/09/2009    Patent stent in the LAD.  80% D2.   • CARDIOVASCULAR STRESS TEST  04/14/2008    S. Echo- 9 min, 30 sec., 80% THR. Inferior infarct.   • CARDIOVASCULAR STRESS TEST  10/15/2009    9 min. 84% /96 Inferoseptal infarct with lani-infarct ischemia.   • CARDIOVASCULAR STRESS TEST  01/11/2013    9 min, 81% THR, 182/80  Large infarct with  mild lani-infarct ischemia.   • CARDIOVASCULAR STRESS TEST  01/14/2016    9 Min, 32 Secs.83% THR. EF 54%. Anteroseptal Infarct   • CARDIOVASCULAR STRESS TEST  02/27/2018    10 Min, 83% THR. 12.2 Mets. EF 57%. Anterior Infarct,.   • CARDIOVASCULAR STRESS TEST  02/13/2020    10 Min. 10.1 METS. 84% THR. BP- 179/79. EF 45%. Apoical Infarct.   • ECHO - CONVERTED  02/29/2008    EF 60%, RVSP- 42mmHg   • ECHO - CONVERTED  10/15/2009    EF 55% Inferoseptal WMA. RVSP- 37mmHg   • ECHO - CONVERTED  01/11/2013    EF 50-55%, RVSP 40 mmHg.   • ECHO - CONVERTED  01/14/2016    EF 50-55%, mild MR, mod pulm HTN   • ECHO - CONVERTED  02/27/2018    EF 55%. RVSP- 36 mmHg   • ECHO - CONVERTED  02/13/2020    EF 45-50%. Apical WMA. Mild MR. RVSP- 18 mmHg.       Current Outpatient Medications   Medication Sig Dispense Refill   • clopidogrel (PLAVIX) 75 MG tablet Take 75 mg by mouth Every Other Day.     • Cyanocobalamin 1000 MCG/ML kit Inject  as directed Every 30 (Thirty) Days.     • Dulaglutide (TRULICITY) 1.5 MG/0.5ML solution pen-injector Inject  under the skin 1 (One) Time Per Week.     • ferrous sulfate 324 (65 Fe) MG tablet delayed-release EC tablet Take 324 mg by mouth Daily With Breakfast.     • gemfibrozil (LOPID) 600 MG tablet Take 1 tablet by mouth Daily. 90 tablet 2   • glyBURIDE (DIAbeta) 5 MG tablet Take 10 mg by mouth 2 (Two) Times a Day With Meals.     • isosorbide mononitrate (IMDUR) 30 MG 24 hr tablet TAKE 1/2 TABLET BY MOUTH DAILY 15 tablet 2   • Linagliptin (TRADJENTA PO) Take  by mouth Daily.     • metFORMIN (GLUCOPHAGE) 500 MG tablet Take 2 tabs twice daily      • metoprolol succinate XL (TOPROL-XL) 25 MG 24 hr tablet Take 1 tablet by mouth Daily. 90 tablet 2   • nitroglycerin (NITROSTAT) 0.4 MG SL tablet 1 under the tongue as needed for angina, may repeat q5mins for up three doses 25 tablet 6   • rosuvastatin (CRESTOR) 20 MG tablet Take 1 tablet by mouth Daily. 90 tablet 2   • sucralfate (Carafate)  1 GM/10ML suspension 4 times a day if needed for heart burn 420 mL 1     No current facility-administered medications for this visit.       Patient has no known allergies.    Past Medical History:   Diagnosis Date   • Arthritis    • Diabetes mellitus (HCC)    • Hyperlipidemia    • Hypertension    • IHD (ischemic heart disease)     Chronic   • Mitral valve insufficiency        Social History     Socioeconomic History   • Marital status:    Tobacco Use   • Smoking status: Former Smoker     Quit date: 2004     Years since quittin.6   • Smokeless tobacco: Never Used   Vaping Use   • Vaping Use: Never used   Substance and Sexual Activity   • Alcohol use: Yes     Comment: Occasional glass of wine   • Drug use: No       Family History   Problem Relation Age of Onset   • Hypertension Mother    • Hyperlipidemia Mother    • Heart disease Father        Review of Systems   Constitutional: Positive for malaise/fatigue. Negative for decreased appetite and diaphoresis.   HENT: Negative for nosebleeds.    Eyes: Negative for blurred vision.   Cardiovascular: Negative for chest pain, claudication, cyanosis, dyspnea on exertion, irregular heartbeat, leg swelling, near-syncope, orthopnea, palpitations, paroxysmal nocturnal dyspnea and syncope.   Respiratory: Negative for shortness of breath and sleep disturbances due to breathing.    Endocrine: Negative for cold intolerance and heat intolerance.   Hematologic/Lymphatic: Negative for bleeding problem. Does not bruise/bleed easily.   Skin: Negative for rash.   Musculoskeletal: Negative for myalgias.   Gastrointestinal: Negative for heartburn, melena and nausea.   Genitourinary: Negative for dysuria and hematuria.   Neurological: Negative for dizziness and light-headedness.   Psychiatric/Behavioral: The patient does not have insomnia and is not nervous/anxious.         BP Readings from Last 5 Encounters:   22 138/72   21 (!) 88/52   20 120/70   19  "120/70   03/18/19 90/60       Wt Readings from Last 5 Encounters:   01/31/22 84.4 kg (186 lb)   05/21/21 79.5 kg (175 lb 3.2 oz)   06/29/20 84.6 kg (186 lb 6.4 oz)   02/13/20 85.1 kg (187 lb 9.8 oz)   12/12/19 85.1 kg (187 lb 9.6 oz)       Objective      Labs 5/21: Glucose 162, BUN 40, creatinine 1.12, GFR greater than 60, sodium 133, potassium 3.9, chloride 105, CO2 22, calcium 9.5, total protein 5.9, albumin 3.9, total bili 0.6, AST 17, ALT 17, ALP 37, WBC 10, RBC 3.29, hemoglobin 10.1, hematocrit 29.9, platelets 274     Prior- H/H 12.4/37.8, RDW 18.1, glucose 182, GFR greater than 60, , , HDL 35, LDL 72, TSH 2.44, A1C 9.0%, B12 175     /72 (BP Location: Left arm)   Pulse 60   Temp 97.7 °F (36.5 °C)   Ht 178 cm (70.08\")   Wt 84.4 kg (186 lb)   BMI 26.63 kg/m²     Vitals and nursing note reviewed.   Constitutional:       Appearance: Healthy appearance. Not in distress.   Eyes:      Pupils: Pupils are equal, round, and reactive to light.   HENT:      Head: Normocephalic.   Neck:      Vascular: No carotid bruit or JVD.   Pulmonary:      Breath sounds: Normal breath sounds.   Cardiovascular:      Normal rate. Regular rhythm.   Pulses:     Intact distal pulses.   Edema:     Peripheral edema absent.   Abdominal:      General: Bowel sounds are normal.      Palpations: Abdomen is soft.   Musculoskeletal: Normal range of motion.      Extremities: No clubbing present.     Cervical back: Normal range of motion. Skin:     General: Skin is warm.      Coloration: Skin is not jaundiced or pale.   Neurological:      Mental Status: Alert and oriented to person, place, and time.   Psychiatric:         Attention and Perception: Attention normal.         Mood and Affect: Mood normal.         Speech: Speech normal.         Behavior: Behavior normal.         Cognition and Memory: Cognition normal.          Procedures: none today          Assessment/Plan   Diagnoses and all orders for this visit:    1. IHD " (ischemic heart disease) (Primary)  -     Stress Test With Myocardial Perfusion One Day; Future  -     Adult Transthoracic Echo Complete W/ Cont if Necessary Per Protocol; Future    2. Other fatigue  -     Stress Test With Myocardial Perfusion One Day; Future  -     Adult Transthoracic Echo Complete W/ Cont if Necessary Per Protocol; Future    3. Primary hypertension  -     Stress Test With Myocardial Perfusion One Day; Future  -     Adult Transthoracic Echo Complete W/ Cont if Necessary Per Protocol; Future    4. Mixed hyperlipidemia  -     Stress Test With Myocardial Perfusion One Day; Future  -     Adult Transthoracic Echo Complete W/ Cont if Necessary Per Protocol; Future    5. Type 2 diabetes mellitus with other specified complication, without long-term current use of insulin (HCC)  -     Stress Test With Myocardial Perfusion One Day; Future  -     Adult Transthoracic Echo Complete W/ Cont if Necessary Per Protocol; Future    Other orders  -     sucralfate (Carafate) 1 GM/10ML suspension; 4 times a day if needed for heart burn  Dispense: 420 mL; Refill: 1  -     gemfibrozil (LOPID) 600 MG tablet; Take 1 tablet by mouth Daily.  Dispense: 90 tablet; Refill: 2  -     metoprolol succinate XL (TOPROL-XL) 25 MG 24 hr tablet; Take 1 tablet by mouth Daily.  Dispense: 90 tablet; Refill: 2  -     rosuvastatin (CRESTOR) 20 MG tablet; Take 1 tablet by mouth Daily.  Dispense: 90 tablet; Refill: 2      IHD/fatigue-patient has history of coronary artery disease and he has significant cardiac risk including diabetes which could cause silent ischemia.  Recent labs shows normal CBC, vitamin B12 and TSH.  Repeat nuclear stress test using treadmill and echocardiogram to ensure no ischemic cause of fatigue and to relook at overall cardiac function.     Hypertension-currently stable.  After improvement of anemia he resumed lisinopril and continues to monitor.  Heart rate and rhythm normal today.  Continue Toprol-XL 25 mg  daily.    Hyperlipidemia-lipids at goal, LFTs normal.  Continue Lopid and Crestor.    Diabetes-recent A1c 7.1.  He will follow with you for management.  Diabetic diet encouraged.    Patient's Body mass index is 26.63 kg/m². indicating that he is overweight (BMI 25-29.9). Obesity-related health conditions include the following: hypertension, coronary heart disease, diabetes mellitus and dyslipidemias. Obesity is worsening. BMI is is above average; BMI management plan is completed. We discussed low calorie, low carb based diet program and portion control.  His weight is up 10 pounds since last office visit.  Goal BMI less than 25 encouraged.    Currently patient denies GI symptoms.  I did give refills for Carafate to take if he were to need it as patient is out of town quite often.  I recommended he have the medication available in case issues develop.     Further recommendations based on cardiac work-up.  A 6-month follow-up visit scheduled.  Please call sooner for cardiac concerns.           Electronically signed by WAQAR Schroeder,  January 31, 2022 11:38 EST

## 2022-03-14 ENCOUNTER — HOSPITAL ENCOUNTER (OUTPATIENT)
Dept: CARDIOLOGY | Facility: HOSPITAL | Age: 72
Discharge: HOME | End: 2022-03-14

## 2022-03-14 VITALS — BODY MASS INDEX: 26.64 KG/M2 | HEIGHT: 70 IN | WEIGHT: 186.07 LBS

## 2022-03-14 DIAGNOSIS — R53.83 OTHER FATIGUE: ICD-10-CM

## 2022-03-14 DIAGNOSIS — I10 PRIMARY HYPERTENSION: ICD-10-CM

## 2022-03-14 DIAGNOSIS — E78.2 MIXED HYPERLIPIDEMIA: ICD-10-CM

## 2022-03-14 DIAGNOSIS — E11.69 TYPE 2 DIABETES MELLITUS WITH OTHER SPECIFIED COMPLICATION, WITHOUT LONG-TERM CURRENT USE OF INSULIN: ICD-10-CM

## 2022-03-14 DIAGNOSIS — I25.9 IHD (ISCHEMIC HEART DISEASE): ICD-10-CM

## 2022-03-14 LAB
BH CV ECHO MEAS - ACS: 2 CM
BH CV ECHO MEAS - AO MAX PG (FULL): 0.18 MMHG
BH CV ECHO MEAS - AO MAX PG: 5.4 MMHG
BH CV ECHO MEAS - AO MEAN PG (FULL): 1 MMHG
BH CV ECHO MEAS - AO MEAN PG: 3 MMHG
BH CV ECHO MEAS - AO ROOT AREA (BSA CORRECTED): 1.7
BH CV ECHO MEAS - AO ROOT AREA: 9.1 CM^2
BH CV ECHO MEAS - AO ROOT DIAM: 3.4 CM
BH CV ECHO MEAS - AO V2 MAX: 116 CM/SEC
BH CV ECHO MEAS - AO V2 MEAN: 74.3 CM/SEC
BH CV ECHO MEAS - AO V2 VTI: 25.9 CM
BH CV ECHO MEAS - BSA(HAYCOCK): 2.1 M^2
BH CV ECHO MEAS - BSA: 2 M^2
BH CV ECHO MEAS - BZI_BMI: 26.7 KILOGRAMS/M^2
BH CV ECHO MEAS - BZI_METRIC_HEIGHT: 177.8 CM
BH CV ECHO MEAS - BZI_METRIC_WEIGHT: 84.4 KG
BH CV ECHO MEAS - EDV(CUBED): 154 ML
BH CV ECHO MEAS - EDV(TEICH): 138.9 ML
BH CV ECHO MEAS - EF(CUBED): 65.2 %
BH CV ECHO MEAS - EF(MOD-BP): 56 %
BH CV ECHO MEAS - EF(TEICH): 56.2 %
BH CV ECHO MEAS - ESV(CUBED): 53.6 ML
BH CV ECHO MEAS - ESV(TEICH): 60.8 ML
BH CV ECHO MEAS - FS: 29.7 %
BH CV ECHO MEAS - IVS/LVPW: 0.99
BH CV ECHO MEAS - IVSD: 1.2 CM
BH CV ECHO MEAS - LA DIMENSION(2D): 4.3 CM
BH CV ECHO MEAS - LA DIMENSION: 4.3 CM
BH CV ECHO MEAS - LA/AO: 1.3
BH CV ECHO MEAS - LAT PEAK E' VEL: 8.5 CM/SEC
BH CV ECHO MEAS - LV IVRT: 0.14 SEC
BH CV ECHO MEAS - LV MASS(C)D: 250.8 GRAMS
BH CV ECHO MEAS - LV MASS(C)DI: 123.9 GRAMS/M^2
BH CV ECHO MEAS - LV MAX PG: 5.2 MMHG
BH CV ECHO MEAS - LV MEAN PG: 2 MMHG
BH CV ECHO MEAS - LV V1 MAX: 114 CM/SEC
BH CV ECHO MEAS - LV V1 MEAN: 64.2 CM/SEC
BH CV ECHO MEAS - LV V1 VTI: 26.1 CM
BH CV ECHO MEAS - LVIDD: 5.4 CM
BH CV ECHO MEAS - LVIDS: 3.8 CM
BH CV ECHO MEAS - LVPWD: 1.2 CM
BH CV ECHO MEAS - MED PEAK E' VEL: 6 CM/SEC
BH CV ECHO MEAS - MV A MAX VEL: 72.6 CM/SEC
BH CV ECHO MEAS - MV DEC SLOPE: 295 CM/SEC^2
BH CV ECHO MEAS - MV DEC TIME: 0.3 SEC
BH CV ECHO MEAS - MV E MAX VEL: 62.8 CM/SEC
BH CV ECHO MEAS - MV E/A: 0.87
BH CV ECHO MEAS - MV MAX PG: 4.2 MMHG
BH CV ECHO MEAS - MV MEAN PG: 1 MMHG
BH CV ECHO MEAS - MV P1/2T MAX VEL: 86.9 CM/SEC
BH CV ECHO MEAS - MV P1/2T: 86.3 MSEC
BH CV ECHO MEAS - MV V2 MAX: 103 CM/SEC
BH CV ECHO MEAS - MV V2 MEAN: 49 CM/SEC
BH CV ECHO MEAS - MV V2 VTI: 34.9 CM
BH CV ECHO MEAS - MVA P1/2T LCG: 2.5 CM^2
BH CV ECHO MEAS - MVA(P1/2T): 2.5 CM^2
BH CV ECHO MEAS - PA MAX PG (FULL): 1 MMHG
BH CV ECHO MEAS - PA MAX PG: 2.5 MMHG
BH CV ECHO MEAS - PA MEAN PG (FULL): 0 MMHG
BH CV ECHO MEAS - PA MEAN PG: 1 MMHG
BH CV ECHO MEAS - PA V2 MAX: 78.9 CM/SEC
BH CV ECHO MEAS - PA V2 MEAN: 54.7 CM/SEC
BH CV ECHO MEAS - PA V2 VTI: 16.9 CM
BH CV ECHO MEAS - RAP SYSTOLE: 10 MMHG
BH CV ECHO MEAS - RV MAX PG: 1.4 MMHG
BH CV ECHO MEAS - RV MEAN PG: 1 MMHG
BH CV ECHO MEAS - RV V1 MAX: 60.1 CM/SEC
BH CV ECHO MEAS - RV V1 MEAN: 41.9 CM/SEC
BH CV ECHO MEAS - RV V1 VTI: 14.3 CM
BH CV ECHO MEAS - RVDD: 3.2 CM
BH CV ECHO MEAS - RVSP: 15 MMHG
BH CV ECHO MEAS - SI(AO): 116.2 ML/M^2
BH CV ECHO MEAS - SI(CUBED): 49.6 ML/M^2
BH CV ECHO MEAS - SI(TEICH): 38.6 ML/M^2
BH CV ECHO MEAS - SV(AO): 235.2 ML
BH CV ECHO MEAS - SV(CUBED): 100.4 ML
BH CV ECHO MEAS - SV(TEICH): 78.1 ML
BH CV ECHO MEAS - TR MAX VEL: 101 CM/SEC
BH CV ECHO MEASUREMENTS AVERAGE E/E' RATIO: 8.66
BH CV REST NUCLEAR ISOTOPE DOSE: 10 MCI
BH CV STRESS NUCLEAR ISOTOPE DOSE: 30 MCI
BH CV STRESS RECOVERY BP: NORMAL MMHG
BH CV STRESS RECOVERY HR: 77 BPM
IVRT: 137 MSEC
LV EF NUC BP: 47 %
MAXIMAL PREDICTED HEART RATE: 150 BPM
MAXIMAL PREDICTED HEART RATE: 150 BPM
PERCENT MAX PREDICTED HR: 84 %
SINUS: 3.2 CM
STRESS BASELINE BP: NORMAL MMHG
STRESS BASELINE HR: 54 BPM
STRESS PERCENT HR: 99 %
STRESS POST ESTIMATED WORKLOAD: 10.1 METS
STRESS POST EXERCISE DUR MIN: 10 MIN
STRESS POST PEAK BP: NORMAL MMHG
STRESS POST PEAK HR: 126 BPM
STRESS TARGET HR: 128 BPM
STRESS TARGET HR: 128 BPM

## 2022-03-14 PROCEDURE — 0 TECHNETIUM SESTAMIBI: Performed by: INTERNAL MEDICINE

## 2022-03-14 PROCEDURE — 93306 TTE W/DOPPLER COMPLETE: CPT

## 2022-03-14 PROCEDURE — 78452 HT MUSCLE IMAGE SPECT MULT: CPT

## 2022-03-14 PROCEDURE — A9500 TC99M SESTAMIBI: HCPCS | Performed by: INTERNAL MEDICINE

## 2022-03-14 PROCEDURE — 93018 CV STRESS TEST I&R ONLY: CPT | Performed by: INTERNAL MEDICINE

## 2022-03-14 PROCEDURE — 78452 HT MUSCLE IMAGE SPECT MULT: CPT | Performed by: INTERNAL MEDICINE

## 2022-03-14 PROCEDURE — 93017 CV STRESS TEST TRACING ONLY: CPT

## 2022-03-14 PROCEDURE — 93306 TTE W/DOPPLER COMPLETE: CPT | Performed by: INTERNAL MEDICINE

## 2022-03-14 RX ADMIN — TECHNETIUM TC 99M SESTAMIBI 1 DOSE: 1 INJECTION INTRAVENOUS at 10:20

## 2022-03-14 RX ADMIN — TECHNETIUM TC 99M SESTAMIBI 1 DOSE: 1 INJECTION INTRAVENOUS at 08:18

## 2022-03-15 RX ORDER — LISINOPRIL 10 MG/1
10 TABLET ORAL DAILY
Qty: 30 TABLET | Refills: 6 | Status: SHIPPED | OUTPATIENT
Start: 2022-03-15 | End: 2022-08-29 | Stop reason: SDUPTHER

## 2022-03-15 NOTE — TELEPHONE ENCOUNTER
----- Message from WAQAR Collazo sent at 3/15/2022  8:14 AM EDT -----  Please inform previous damage noted but no new area of concern. BP was elevated. Recommend adding lisinopril 10 mg daily. Monitor BP and call for any concerns.

## 2022-03-15 NOTE — TELEPHONE ENCOUNTER
Pt made aware, voiced understanding.  Ask for 30 day rx of lisinopril to be sent to [facility] in [place].

## 2022-03-16 ENCOUNTER — TELEPHONE (OUTPATIENT)
Dept: CARDIOLOGY | Facility: CLINIC | Age: 72
End: 2022-03-16

## 2022-03-16 NOTE — TELEPHONE ENCOUNTER
Received fax from Dr. Lindo for cardiac clearance for patient to have a colonoscopy and an EGD. Patient is on Plavix and they are requesting to hold. According to our records, patient's last stenting was done in 2004.         Fax [phone]

## 2022-04-11 DIAGNOSIS — I25.9 IHD (ISCHEMIC HEART DISEASE): ICD-10-CM

## 2022-04-11 RX ORDER — ISOSORBIDE MONONITRATE 30 MG/1
TABLET, EXTENDED RELEASE ORAL
Qty: 15 TABLET | Refills: 2 | Status: SHIPPED | OUTPATIENT
Start: 2022-04-11 | End: 2022-07-13

## 2022-07-13 DIAGNOSIS — I25.9 IHD (ISCHEMIC HEART DISEASE): ICD-10-CM

## 2022-07-13 RX ORDER — ISOSORBIDE MONONITRATE 30 MG/1
TABLET, EXTENDED RELEASE ORAL
Qty: 15 TABLET | Refills: 2 | Status: SHIPPED | OUTPATIENT
Start: 2022-07-13 | End: 2022-08-29 | Stop reason: SDUPTHER

## 2022-08-29 ENCOUNTER — OFFICE VISIT (OUTPATIENT)
Dept: CARDIOLOGY | Facility: CLINIC | Age: 72
End: 2022-08-29

## 2022-08-29 VITALS
BODY MASS INDEX: 25.43 KG/M2 | HEART RATE: 68 BPM | SYSTOLIC BLOOD PRESSURE: 124 MMHG | DIASTOLIC BLOOD PRESSURE: 72 MMHG | HEIGHT: 70 IN | WEIGHT: 177.6 LBS

## 2022-08-29 DIAGNOSIS — E11.69 TYPE 2 DIABETES MELLITUS WITH OTHER SPECIFIED COMPLICATION, WITHOUT LONG-TERM CURRENT USE OF INSULIN: ICD-10-CM

## 2022-08-29 DIAGNOSIS — I10 PRIMARY HYPERTENSION: ICD-10-CM

## 2022-08-29 DIAGNOSIS — I34.0 NONRHEUMATIC MITRAL VALVE REGURGITATION: ICD-10-CM

## 2022-08-29 DIAGNOSIS — I25.9 IHD (ISCHEMIC HEART DISEASE): Primary | ICD-10-CM

## 2022-08-29 DIAGNOSIS — E78.2 MIXED HYPERLIPIDEMIA: ICD-10-CM

## 2022-08-29 PROCEDURE — 99213 OFFICE O/P EST LOW 20 MIN: CPT | Performed by: NURSE PRACTITIONER

## 2022-08-29 RX ORDER — CLOPIDOGREL BISULFATE 75 MG/1
75 TABLET ORAL EVERY OTHER DAY
Qty: 90 TABLET | Refills: 3 | Status: SHIPPED | OUTPATIENT
Start: 2022-08-29

## 2022-08-29 RX ORDER — LISINOPRIL 10 MG/1
10 TABLET ORAL DAILY
Qty: 90 TABLET | Refills: 3 | Status: SHIPPED | OUTPATIENT
Start: 2022-08-29

## 2022-08-29 RX ORDER — ISOSORBIDE MONONITRATE 30 MG/1
15 TABLET, EXTENDED RELEASE ORAL DAILY
Qty: 45 TABLET | Refills: 3 | Status: SHIPPED | OUTPATIENT
Start: 2022-08-29

## 2022-08-29 NOTE — PROGRESS NOTES
Chief Complaint   Patient presents with   • Follow-up     Cardiac management . Has no cardiac complaints today   • LABS     Had labs last week per PCP.  I will call for results   • Med Refill     Needs refills on Plavix , Imdur and Lisinopril 90 day supply to [facility]       Flako Mcfarland is a 71 y.o. male with HTN, hyperlipidemia, DM, and IHD diagnosed in 2004 when he underwent stenting. Cardiac cath in 2009 showed stent in LAD was patent and 80% disease of the diagonal which has been managed medically. Stress in January 2016 showed only fixed defect, medical management continued. 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 Stress showed anterior infarct without ischemia. LVEF 55-60% and RVSP 36 mmHg. Stress test repeated 2/13/20 showed mild hypertensive response, small apical infarct with no ischemia. Amlodipine 5 mg added. In May 2021 he had decrease in H&H for which Plavix and aspirin were stopped and GI work-up advised.  After taking Carafate his GI symptoms resolved.  H&H improved.    On 3/14/22 repeat stress and echo showed normal LV function and negative for ischemia.  He did have a blood pressure response therefore lisinopril 10 mg daily was added.    Today he returns to the office for a follow up visit.  He continues his normal daily activities and works full-time driving a truck.  He admits to recent GI work-up with negative results.  No signs of bleeding noted.  No recent change in cardiac medications noted.    Cardiac History:    Past Surgical History:   Procedure Laterality Date   • CARDIAC CATHETERIZATION  2004    MI & One stent.   • CARDIAC CATHETERIZATION  11/09/2009    Patent stent in the LAD.  80% D2.   • CARDIOVASCULAR STRESS TEST  04/14/2008    S. Echo- 9 min, 30 sec., 80% THR. Inferior infarct.   • CARDIOVASCULAR STRESS TEST  10/15/2009    9 min. 84% /96 Inferoseptal infarct with lani-infarct  ischemia.   • CARDIOVASCULAR STRESS TEST  01/11/2013    9 min, 81% THR, 182/80 Large infarct with  mild lani-infarct ischemia.   • CARDIOVASCULAR STRESS TEST  01/14/2016    9 Min, 32 Secs.83% THR. EF 54%. Anteroseptal Infarct   • CARDIOVASCULAR STRESS TEST  02/27/2018    10 Min, 83% THR. 12.2 Mets. EF 57%. Anterior Infarct,.   • CARDIOVASCULAR STRESS TEST  02/13/2020    10 Min. 10.1 METS. 84% THR. BP- 179/79. EF 45%. Apoical Infarct.   • CARDIOVASCULAR STRESS TEST  03/14/2022    10 Min. 10.1 METS. 84% THR. BP- 209/84. EF 47%. Apical Infarct   • ECHO - CONVERTED  02/29/2008    EF 60%, RVSP- 42mmHg   • ECHO - CONVERTED  10/15/2009    EF 55% Inferoseptal WMA. RVSP- 37mmHg   • ECHO - CONVERTED  01/11/2013    EF 50-55%, RVSP 40 mmHg.   • ECHO - CONVERTED  01/14/2016    EF 50-55%, mild MR, mod pulm HTN   • ECHO - CONVERTED  02/27/2018    EF 55%. RVSP- 36 mmHg   • ECHO - CONVERTED  02/13/2020    EF 45-50%. Apical WMA. Mild MR. RVSP- 18 mmHg.   • ECHO - CONVERTED  03/14/2022    EF 50%. Apical WMA. LA- 4.3 Cm. Mild MR. RVSP- 15 mmHg       Current Outpatient Medications   Medication Sig Dispense Refill   • clopidogrel (PLAVIX) 75 MG tablet Take 1 tablet by mouth Every Other Day. 90 tablet 3   • Cyanocobalamin 1000 MCG/ML kit Inject  as directed Every 30 (Thirty) Days.     • Dulaglutide 1.5 MG/0.5ML solution pen-injector Inject  under the skin 1 (One) Time Per Week.     • ferrous sulfate 324 (65 Fe) MG tablet delayed-release EC tablet Take 324 mg by mouth Daily With Breakfast.     • gemfibrozil (LOPID) 600 MG tablet Take 1 tablet by mouth Daily. 90 tablet 2   • glyBURIDE (DIAbeta) 5 MG tablet Take 10 mg by mouth 2 (Two) Times a Day With Meals.     • isosorbide mononitrate (IMDUR) 30 MG 24 hr tablet Take 0.5 tablets by mouth Daily. 45 tablet 3   • Linagliptin (TRADJENTA PO) Take  by mouth Daily.     • lisinopril (PRINIVIL,ZESTRIL) 10 MG tablet Take 1 tablet by mouth Daily. 90 tablet 3   • metFORMIN (GLUCOPHAGE) 500 MG tablet  Take 2 tabs twice daily      • metoprolol succinate XL (TOPROL-XL) 25 MG 24 hr tablet Take 1 tablet by mouth Daily. 90 tablet 2   • nitroglycerin (NITROSTAT) 0.4 MG SL tablet 1 under the tongue as needed for angina, may repeat q5mins for up three doses 25 tablet 6   • rosuvastatin (CRESTOR) 20 MG tablet Take 1 tablet by mouth Daily. 90 tablet 2   • sucralfate (Carafate) 1 GM/10ML suspension 4 times a day if needed for heart burn 420 mL 1     No current facility-administered medications for this visit.       Patient has no known allergies.    Past Medical History:   Diagnosis Date   • Arthritis    • Diabetes mellitus (HCC)    • Hyperlipidemia    • Hypertension    • IHD (ischemic heart disease)     Chronic   • Mitral valve insufficiency        Social History     Socioeconomic History   • Marital status:    Tobacco Use   • Smoking status: Former Smoker     Quit date: 2004     Years since quittin.2   • Smokeless tobacco: Never Used   Vaping Use   • Vaping Use: Never used   Substance and Sexual Activity   • Alcohol use: Yes     Comment: Occasional glass of wine   • Drug use: No       Family History   Problem Relation Age of Onset   • Hypertension Mother    • Hyperlipidemia Mother    • Heart disease Father        Review of Systems   Constitutional: Negative for decreased appetite, diaphoresis and malaise/fatigue.   HENT: Negative for nosebleeds.    Eyes: Negative for blurred vision.   Cardiovascular: Negative for chest pain, claudication, cyanosis, dyspnea on exertion, irregular heartbeat, leg swelling, near-syncope, orthopnea, palpitations, paroxysmal nocturnal dyspnea and syncope.   Respiratory: Negative for shortness of breath.    Endocrine: Negative for cold intolerance and heat intolerance.   Hematologic/Lymphatic: Does not bruise/bleed easily.   Skin: Negative for rash.   Musculoskeletal: Negative for myalgias.   Gastrointestinal: Negative for heartburn, melena and nausea.   Genitourinary: Negative  "for dysuria and hematuria.   Neurological: Negative for dizziness and light-headedness.   Psychiatric/Behavioral: The patient does not have insomnia and is not nervous/anxious.         BP Readings from Last 5 Encounters:   08/29/22 124/72   01/31/22 138/72   05/21/21 (!) 88/52   06/29/20 120/70   12/12/19 120/70       Wt Readings from Last 5 Encounters:   08/29/22 80.6 kg (177 lb 9.6 oz)   03/14/22 84.4 kg (186 lb 1.1 oz)   01/31/22 84.4 kg (186 lb)   05/21/21 79.5 kg (175 lb 3.2 oz)   06/29/20 84.6 kg (186 lb 6.4 oz)       Objective      Labs 12- per PCP: Glucose 138, BUN 8, creatinine 0.93, GFR greater than 60, sodium 135, potassium 4.1, iron, TIBC, and percent saturation within normal limits, A1c 7.1, CBC within normal range, TSH 2.65 ferritin and B12 normal    Labs 5/21: Glucose 162, BUN 40, creatinine 1.12, GFR greater than 60, sodium 133, potassium 3.9, chloride 105, CO2 22, calcium 9.5, total protein 5.9, albumin 3.9, total bili 0.6, AST 17, ALT 17, ALP 37, WBC 10, RBC 3.29, hemoglobin 10.1, hematocrit 29.9, platelets 274     Prior- H/H 12.4/37.8, RDW 18.1, glucose 182, GFR greater than 60, , , HDL 35, LDL 72, TSH 2.44, A1C 9.0%, B12 175     /72 (BP Location: Left arm)   Pulse 68   Ht 178 cm (70.08\")   Wt 80.6 kg (177 lb 9.6 oz)   BMI 25.42 kg/m²     Vitals and nursing note reviewed.   Eyes:      Pupils: Pupils are equal, round, and reactive to light.   HENT:      Head: Normocephalic.   Neck:      Vascular: No carotid bruit or JVD.   Pulmonary:      Breath sounds: Normal breath sounds.   Cardiovascular:      Normal rate. Regular rhythm.      Murmurs: There is a grade 1 to 2/6 systolic murmur.   Pulses:     Intact distal pulses.   Edema:     Peripheral edema absent.   Abdominal:      General: Bowel sounds are normal.      Palpations: Abdomen is soft.   Musculoskeletal: Normal range of motion.      Cervical back: Normal range of motion. Skin:     General: Skin is warm. "   Neurological:      Mental Status: Alert and oriented to person, place, and time.          Procedures: none today          Assessment & Plan   Diagnoses and all orders for this visit:    1. IHD (ischemic heart disease) (Primary)  -     clopidogrel (PLAVIX) 75 MG tablet; Take 1 tablet by mouth Every Other Day.  Dispense: 90 tablet; Refill: 3  -     isosorbide mononitrate (IMDUR) 30 MG 24 hr tablet; Take 0.5 tablets by mouth Daily.  Dispense: 45 tablet; Refill: 3    2. Nonrheumatic mitral valve regurgitation    3. Primary hypertension  -     lisinopril (PRINIVIL,ZESTRIL) 10 MG tablet; Take 1 tablet by mouth Daily.  Dispense: 90 tablet; Refill: 3    4. Mixed hyperlipidemia    5. Type 2 diabetes mellitus with other specified complication, without long-term current use of insulin (HCC)      IHD  - Anginal symptoms denied.  The reports of his recent stress test and echocardiogram were reviewed.  -Continue Plavix every other day.  Currently bleeding denied.    MR  -Mild per recent echo  -Clinically stable.  No repeat testing warranted at this time.    Hypertension  -BP well controlled.  -Continue current antihypertensive agents.  -His weight is down about 10 pounds and BMI normal.  Encouraged to maintain.    Diabetes  - Managed by PCP  -Patient admits to good control    Currently patient appears stable from a cardiac standpoint.  A 6-month follow-up visit scheduled.  Please call sooner for cardiac concerns.

## 2022-10-11 ENCOUNTER — TELEPHONE (OUTPATIENT)
Dept: CARDIOLOGY | Facility: CLINIC | Age: 72
End: 2022-10-11

## 2022-10-11 NOTE — TELEPHONE ENCOUNTER
Received fax from Dr. Hayden for cardiac clearance for patient to have a colonoscopy. Patient is on Plavix, unclear if needing to hold. According to our records, patient's last stenting was done in 2004.      Fax [phone]

## 2022-11-07 RX ORDER — METOPROLOL SUCCINATE 25 MG/1
25 TABLET, EXTENDED RELEASE ORAL DAILY
Qty: 90 TABLET | Refills: 3 | Status: SHIPPED | OUTPATIENT
Start: 2022-11-07

## 2022-11-07 RX ORDER — GEMFIBROZIL 600 MG/1
TABLET, FILM COATED ORAL
Qty: 90 TABLET | Refills: 3 | Status: SHIPPED | OUTPATIENT
Start: 2022-11-07

## 2022-12-27 RX ORDER — ROSUVASTATIN CALCIUM 20 MG/1
TABLET, COATED ORAL
Qty: 90 TABLET | Refills: 2 | Status: SHIPPED | OUTPATIENT
Start: 2022-12-27

## 2023-03-06 ENCOUNTER — OFFICE VISIT (OUTPATIENT)
Dept: CARDIOLOGY | Facility: CLINIC | Age: 73
End: 2023-03-06
Payer: COMMERCIAL

## 2023-03-06 VITALS
DIASTOLIC BLOOD PRESSURE: 68 MMHG | SYSTOLIC BLOOD PRESSURE: 120 MMHG | WEIGHT: 176.8 LBS | HEIGHT: 70 IN | BODY MASS INDEX: 25.31 KG/M2 | HEART RATE: 68 BPM

## 2023-03-06 DIAGNOSIS — I10 PRIMARY HYPERTENSION: ICD-10-CM

## 2023-03-06 DIAGNOSIS — I25.9 IHD (ISCHEMIC HEART DISEASE): Primary | ICD-10-CM

## 2023-03-06 DIAGNOSIS — I34.0 NONRHEUMATIC MITRAL VALVE REGURGITATION: ICD-10-CM

## 2023-03-06 DIAGNOSIS — E11.69 TYPE 2 DIABETES MELLITUS WITH OTHER SPECIFIED COMPLICATION, WITHOUT LONG-TERM CURRENT USE OF INSULIN: ICD-10-CM

## 2023-03-06 PROCEDURE — 99214 OFFICE O/P EST MOD 30 MIN: CPT | Performed by: NURSE PRACTITIONER

## 2023-03-06 RX ORDER — NITROGLYCERIN 0.4 MG/1
TABLET SUBLINGUAL
Qty: 25 TABLET | Refills: 6 | Status: SHIPPED | OUTPATIENT
Start: 2023-03-06

## 2023-03-06 NOTE — PROGRESS NOTES
Chief Complaint   Patient presents with   • Follow-up     Cardiac management. Has no cardiac complaints today.   • LABS     Will have labs later today at PCP office    • Med Refill     Request a new prescription of Nitro SL . [facility]       Flako Mcfarland is a 72 y.o. male with HTN, hyperlipidemia, DM, and IHD diagnosed in 2004 when he underwent stenting. Cardiac cath in 2009 showed stent in LAD was patent and 80% disease of the diagonal which has been managed medically. Stress in January 2016 showed only fixed defect, medical management continued. 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 Stress showed anterior infarct without ischemia. LVEF 55-60% and RVSP 36 mmHg. Stress test repeated 2/13/20 showed mild hypertensive response, small apical infarct with no ischemia. Amlodipine 5 mg added. In May 2021 he had decrease in H&H for which Plavix and aspirin were stopped and GI work-up advised.  After taking Carafate his GI symptoms resolved.  H&H improved. On 3/14/22 repeat stress and echo showed normal LV function and negative for ischemia.  He did have an increased blood pressure response therefore lisinopril 10 mg daily was added.    Today he returns to the office for follow-up visit.  He continues to work full-time job and maintain his normal daily activity without cardiac symptoms or concerns.  No recent change in cardiac medications noted.  He plans to have labs drawn later today.     Cardiac History:    Past Surgical History:   Procedure Laterality Date   • CARDIAC CATHETERIZATION  2004    MI & One stent.   • CARDIAC CATHETERIZATION  11/09/2009    Patent stent in the LAD.  80% D2.   • CARDIOVASCULAR STRESS TEST  04/14/2008    S. Echo- 9 min, 30 sec., 80% THR. Inferior infarct.   • CARDIOVASCULAR STRESS TEST  10/15/2009    9 min. 84% /96 Inferoseptal infarct with lani-infarct ischemia.   • CARDIOVASCULAR STRESS  TEST  01/11/2013    9 min, 81% THR, 182/80 Large infarct with  mild lani-infarct ischemia.   • CARDIOVASCULAR STRESS TEST  01/14/2016    9 Min, 32 Secs.83% THR. EF 54%. Anteroseptal Infarct   • CARDIOVASCULAR STRESS TEST  02/27/2018    10 Min, 83% THR. 12.2 Mets. EF 57%. Anterior Infarct,.   • CARDIOVASCULAR STRESS TEST  02/13/2020    10 Min. 10.1 METS. 84% THR. BP- 179/79. EF 45%. Apoical Infarct.   • CARDIOVASCULAR STRESS TEST  03/14/2022    10 Min. 10.1 METS. 84% THR. BP- 209/84. EF 47%. Apical Infarct   • ECHO - CONVERTED  02/29/2008    EF 60%, RVSP- 42mmHg   • ECHO - CONVERTED  10/15/2009    EF 55% Inferoseptal WMA. RVSP- 37mmHg   • ECHO - CONVERTED  01/11/2013    EF 50-55%, RVSP 40 mmHg.   • ECHO - CONVERTED  01/14/2016    EF 50-55%, mild MR, mod pulm HTN   • ECHO - CONVERTED  02/27/2018    EF 55%. RVSP- 36 mmHg   • ECHO - CONVERTED  02/13/2020    EF 45-50%. Apical WMA. Mild MR. RVSP- 18 mmHg.   • ECHO - CONVERTED  03/14/2022    EF 50%. Apical WMA. LA- 4.3 Cm. Mild MR. RVSP- 15 mmHg       Current Outpatient Medications   Medication Sig Dispense Refill   • clopidogrel (PLAVIX) 75 MG tablet Take 1 tablet by mouth Every Other Day. 90 tablet 3   • Cyanocobalamin 1000 MCG/ML kit Inject  as directed Every 30 (Thirty) Days.     • Dulaglutide 1.5 MG/0.5ML solution pen-injector Inject  under the skin 1 (One) Time Per Week.     • ferrous sulfate 324 (65 Fe) MG tablet delayed-release EC tablet Take 1 tablet by mouth Daily With Breakfast.     • gemfibrozil (LOPID) 600 MG tablet TAKE ONE TABLET BY MOUTH DAILY 90 tablet 3   • glyBURIDE (DIAbeta) 5 MG tablet Take 2 tablets by mouth 2 (Two) Times a Day With Meals.     • isosorbide mononitrate (IMDUR) 30 MG 24 hr tablet Take 0.5 tablets by mouth Daily. 45 tablet 3   • Linagliptin (TRADJENTA PO) Take  by mouth Daily.     • lisinopril (PRINIVIL,ZESTRIL) 10 MG tablet Take 1 tablet by mouth Daily. 90 tablet 3   • metFORMIN (GLUCOPHAGE) 500 MG tablet Take 2 tabs twice daily      •  metoprolol succinate XL (TOPROL-XL) 25 MG 24 hr tablet Take 1 tablet by mouth Daily. 90 tablet 3   • nitroglycerin (NITROSTAT) 0.4 MG SL tablet 1 under the tongue as needed for angina, may repeat q5mins for up three doses 25 tablet 6   • rosuvastatin (CRESTOR) 20 MG tablet TAKE ONE TABLET BY MOUTH DAILY 90 tablet 2   • sucralfate (Carafate) 1 GM/10ML suspension 4 times a day if needed for heart burn 420 mL 1     No current facility-administered medications for this visit.       Patient has no known allergies.    Past Medical History:   Diagnosis Date   • Arthritis    • Diabetes mellitus (HCC)    • Hyperlipidemia    • Hypertension    • IHD (ischemic heart disease)     Chronic   • Mitral valve insufficiency        Social History     Socioeconomic History   • Marital status:    Tobacco Use   • Smoking status: Former     Types: Cigarettes     Quit date: 2004     Years since quittin.7   • Smokeless tobacco: Never   Vaping Use   • Vaping Use: Never used   Substance and Sexual Activity   • Alcohol use: Yes     Comment: Occasional glass of wine   • Drug use: No       Family History   Problem Relation Age of Onset   • Hypertension Mother    • Hyperlipidemia Mother    • Heart disease Father        Review of Systems   Constitutional: Negative for decreased appetite, diaphoresis and malaise/fatigue.   HENT: Negative for nosebleeds.    Eyes: Negative for blurred vision.   Cardiovascular: Negative for chest pain, claudication, cyanosis, dyspnea on exertion, irregular heartbeat, leg swelling, near-syncope, orthopnea, palpitations, paroxysmal nocturnal dyspnea and syncope.   Respiratory: Negative for shortness of breath.    Endocrine: Negative for cold intolerance and heat intolerance.   Hematologic/Lymphatic: Does not bruise/bleed easily.   Skin: Negative for rash.   Musculoskeletal: Negative for myalgias.   Gastrointestinal: Negative for heartburn, melena and nausea.   Genitourinary: Negative for dysuria and  "hematuria.   Neurological: Negative for dizziness and light-headedness.   Psychiatric/Behavioral: The patient does not have insomnia and is not nervous/anxious.         BP Readings from Last 5 Encounters:   03/06/23 120/68   08/29/22 124/72   01/31/22 138/72   05/21/21 (!) 88/52   06/29/20 120/70       Wt Readings from Last 5 Encounters:   03/06/23 80.2 kg (176 lb 12.8 oz)   08/29/22 80.6 kg (177 lb 9.6 oz)   03/14/22 84.4 kg (186 lb 1.1 oz)   01/31/22 84.4 kg (186 lb)   05/21/21 79.5 kg (175 lb 3.2 oz)       Objective      Labs 12- per PCP: Glucose 138, BUN 8, creatinine 0.93, GFR greater than 60, sodium 135, potassium 4.1, iron, TIBC, and percent saturation within normal limits, A1c 7.1, CBC within normal range, TSH 2.65 ferritin and B12 normal     Labs 5/21: Glucose 162, BUN 40, creatinine 1.12, GFR greater than 60, sodium 133, potassium 3.9, chloride 105, CO2 22, calcium 9.5, total protein 5.9, albumin 3.9, total bili 0.6, AST 17, ALT 17, ALP 37, WBC 10, RBC 3.29, hemoglobin 10.1, hematocrit 29.9, platelets 274    /68 (BP Location: Left arm, Patient Position: Sitting)   Pulse 68   Ht 178 cm (70.08\")   Wt 80.2 kg (176 lb 12.8 oz)   BMI 25.31 kg/m²     Vitals and nursing note reviewed.   Constitutional:       Appearance: Healthy appearance. Not in distress.   Eyes:      Conjunctiva/sclera: Conjunctivae normal.      Pupils: Pupils are equal, round, and reactive to light.   HENT:      Head: Normocephalic.   Pulmonary:      Effort: Pulmonary effort is normal.      Breath sounds: Normal breath sounds.   Cardiovascular:      PMI at left midclavicular line. Normal rate. Regular rhythm.      Murmurs: There is a grade 2/6 low frequency systolic murmur.   Pulses:     Intact distal pulses.   Edema:     Peripheral edema absent.   Abdominal:      General: Bowel sounds are normal.      Palpations: Abdomen is soft.   Musculoskeletal: Normal range of motion.      Cervical back: Normal range of motion and neck " supple. Skin:     General: Skin is warm and dry.   Neurological:      Mental Status: Alert, oriented to person, place, and time and oriented to person, place and time.          Procedures : none today          Assessment & Plan   Diagnoses and all orders for this visit:    1. IHD (ischemic heart disease) (Primary)    2. Nonrheumatic mitral valve regurgitation    3. Primary hypertension    4. Type 2 diabetes mellitus with other specified complication, without long-term current use of insulin (HCC)    Other orders  -     nitroglycerin (NITROSTAT) 0.4 MG SL tablet; 1 under the tongue as needed for angina, may repeat q5mins for up three doses  Dispense: 25 tablet; Refill: 6       IHD  -Stress test 2022 negative for ischemia  -Anginal symptoms denied  -Continue current dose Plavix.  He is not on aspirin therapy due to history of GI bleed.  Currently bleeding or increased bruising denied.    MR  - Echocardiogram 2022 showed mild MR  -Clinically stable    Patient remains asymptomatic therefore no repeat cardiac testing warranted at this time.  We will plan to repeat next year due to CDL licensure.    Diabetes  -Patient admits to good control  -Patient will follow with your office for monitoring/management    Mixed hyperlipidemia  -On Crestor and Lopid, appears to be tolerating well  -Patient will follow with your office for lab orders/management    A 6-month follow-up visit scheduled.  Please call sooner for cardiac concerns.

## 2023-08-29 DIAGNOSIS — I10 PRIMARY HYPERTENSION: ICD-10-CM

## 2023-08-30 RX ORDER — LISINOPRIL 10 MG/1
TABLET ORAL
Qty: 90 TABLET | Refills: 3 | Status: SHIPPED | OUTPATIENT
Start: 2023-08-30

## 2023-09-03 DIAGNOSIS — I25.9 IHD (ISCHEMIC HEART DISEASE): ICD-10-CM

## 2023-09-06 RX ORDER — ISOSORBIDE MONONITRATE 30 MG/1
TABLET, EXTENDED RELEASE ORAL
Qty: 45 TABLET | Refills: 3 | Status: SHIPPED | OUTPATIENT
Start: 2023-09-06

## 2023-09-27 RX ORDER — ROSUVASTATIN CALCIUM 20 MG/1
TABLET, COATED ORAL
Qty: 90 TABLET | Refills: 2 | Status: SHIPPED | OUTPATIENT
Start: 2023-09-27

## 2023-11-06 RX ORDER — METOPROLOL SUCCINATE 25 MG/1
25 TABLET, EXTENDED RELEASE ORAL DAILY
Qty: 90 TABLET | Refills: 1 | Status: SHIPPED | OUTPATIENT
Start: 2023-11-06

## 2023-11-06 RX ORDER — GEMFIBROZIL 600 MG/1
600 TABLET, FILM COATED ORAL DAILY
Qty: 90 TABLET | Refills: 1 | Status: SHIPPED | OUTPATIENT
Start: 2023-11-06

## 2023-11-24 DIAGNOSIS — I25.9 IHD (ISCHEMIC HEART DISEASE): ICD-10-CM

## 2023-11-27 RX ORDER — CLOPIDOGREL BISULFATE 75 MG/1
75 TABLET ORAL EVERY OTHER DAY
Qty: 45 TABLET | Refills: 4 | Status: SHIPPED | OUTPATIENT
Start: 2023-11-27

## 2024-02-15 ENCOUNTER — OFFICE VISIT (OUTPATIENT)
Dept: CARDIOLOGY | Facility: CLINIC | Age: 74
End: 2024-02-15
Payer: COMMERCIAL

## 2024-02-15 VITALS
HEART RATE: 56 BPM | WEIGHT: 171 LBS | DIASTOLIC BLOOD PRESSURE: 70 MMHG | HEIGHT: 70 IN | SYSTOLIC BLOOD PRESSURE: 128 MMHG | BODY MASS INDEX: 24.48 KG/M2

## 2024-02-15 DIAGNOSIS — I25.9 IHD (ISCHEMIC HEART DISEASE): Primary | ICD-10-CM

## 2024-02-15 DIAGNOSIS — I34.0 NONRHEUMATIC MITRAL VALVE REGURGITATION: ICD-10-CM

## 2024-02-15 DIAGNOSIS — E78.2 MIXED HYPERLIPIDEMIA: ICD-10-CM

## 2024-02-15 DIAGNOSIS — I10 PRIMARY HYPERTENSION: ICD-10-CM

## 2024-02-15 DIAGNOSIS — R53.83 OTHER FATIGUE: ICD-10-CM

## 2024-02-15 DIAGNOSIS — E11.69 TYPE 2 DIABETES MELLITUS WITH OTHER SPECIFIED COMPLICATION, WITHOUT LONG-TERM CURRENT USE OF INSULIN: ICD-10-CM

## 2024-02-15 DIAGNOSIS — R07.89 OTHER CHEST PAIN: ICD-10-CM

## 2024-02-15 PROCEDURE — 99214 OFFICE O/P EST MOD 30 MIN: CPT | Performed by: NURSE PRACTITIONER

## 2024-02-15 RX ORDER — NITROGLYCERIN 0.4 MG/1
TABLET SUBLINGUAL
Qty: 25 TABLET | Refills: 6 | Status: SHIPPED | OUTPATIENT
Start: 2024-02-15

## 2024-02-15 RX ORDER — PANTOPRAZOLE SODIUM 40 MG/1
40 TABLET, DELAYED RELEASE ORAL DAILY
COMMUNITY

## 2024-02-22 ENCOUNTER — HOSPITAL ENCOUNTER (OUTPATIENT)
Dept: CARDIOLOGY | Facility: HOSPITAL | Age: 74
Discharge: HOME | End: 2024-02-22
Payer: COMMERCIAL

## 2024-02-22 VITALS — HEIGHT: 70 IN | BODY MASS INDEX: 24.49 KG/M2 | WEIGHT: 171.08 LBS

## 2024-02-22 DIAGNOSIS — E78.2 MIXED HYPERLIPIDEMIA: ICD-10-CM

## 2024-02-22 DIAGNOSIS — R07.89 OTHER CHEST PAIN: ICD-10-CM

## 2024-02-22 DIAGNOSIS — I34.0 NONRHEUMATIC MITRAL VALVE REGURGITATION: ICD-10-CM

## 2024-02-22 DIAGNOSIS — I25.9 IHD (ISCHEMIC HEART DISEASE): ICD-10-CM

## 2024-02-22 DIAGNOSIS — I10 PRIMARY HYPERTENSION: ICD-10-CM

## 2024-02-22 PROCEDURE — 93306 TTE W/DOPPLER COMPLETE: CPT

## 2024-02-22 PROCEDURE — A9500 TC99M SESTAMIBI: HCPCS | Performed by: INTERNAL MEDICINE

## 2024-02-22 PROCEDURE — 93017 CV STRESS TEST TRACING ONLY: CPT

## 2024-02-22 PROCEDURE — 0 TECHNETIUM SESTAMIBI: Performed by: INTERNAL MEDICINE

## 2024-02-22 PROCEDURE — 78452 HT MUSCLE IMAGE SPECT MULT: CPT

## 2024-02-22 PROCEDURE — 25010000002 REGADENOSON 0.4 MG/5ML SOLUTION: Performed by: INTERNAL MEDICINE

## 2024-02-22 RX ORDER — REGADENOSON 0.08 MG/ML
0.4 INJECTION, SOLUTION INTRAVENOUS
Status: COMPLETED | OUTPATIENT
Start: 2024-02-22 | End: 2024-02-22

## 2024-02-22 RX ADMIN — TECHNETIUM TC 99M SESTAMIBI 1 DOSE: 1 INJECTION INTRAVENOUS at 11:29

## 2024-02-22 RX ADMIN — REGADENOSON 0.4 MG: 0.08 INJECTION, SOLUTION INTRAVENOUS at 11:28

## 2024-02-22 RX ADMIN — TECHNETIUM TC 99M SESTAMIBI 1 DOSE: 1 INJECTION INTRAVENOUS at 08:27

## 2024-02-23 LAB
AORTIC DIMENSIONLESS INDEX: 0.98 (DI)
BH CV ECHO MEAS - ACS: 1.97 CM
BH CV ECHO MEAS - AO MAX PG: 5.3 MMHG
BH CV ECHO MEAS - AO MEAN PG: 2.8 MMHG
BH CV ECHO MEAS - AO ROOT DIAM: 3.2 CM
BH CV ECHO MEAS - AO V2 MAX: 115 CM/SEC
BH CV ECHO MEAS - AO V2 VTI: 24.7 CM
BH CV ECHO MEAS - EDV(CUBED): 160.5 ML
BH CV ECHO MEAS - EF_3D-VOL: 53 %
BH CV ECHO MEAS - ESV(CUBED): 41.5 ML
BH CV ECHO MEAS - FS: 36.3 %
BH CV ECHO MEAS - IVS/LVPW: 0.87 CM
BH CV ECHO MEAS - IVSD: 0.94 CM
BH CV ECHO MEAS - LA DIMENSION: 4 CM
BH CV ECHO MEAS - LAT PEAK E' VEL: 7.1 CM/SEC
BH CV ECHO MEAS - LV MASS(C)D: 211.9 GRAMS
BH CV ECHO MEAS - LV MAX PG: 5 MMHG
BH CV ECHO MEAS - LV MEAN PG: 2.23 MMHG
BH CV ECHO MEAS - LV V1 MAX: 111.6 CM/SEC
BH CV ECHO MEAS - LV V1 VTI: 24.1 CM
BH CV ECHO MEAS - LVIDD: 5.4 CM
BH CV ECHO MEAS - LVIDS: 3.5 CM
BH CV ECHO MEAS - LVPWD: 1.08 CM
BH CV ECHO MEAS - MED PEAK E' VEL: 5.3 CM/SEC
BH CV ECHO MEAS - MV A MAX VEL: 72.3 CM/SEC
BH CV ECHO MEAS - MV DEC SLOPE: 255.8 CM/SEC2
BH CV ECHO MEAS - MV DEC TIME: 0.31 SEC
BH CV ECHO MEAS - MV E MAX VEL: 51.2 CM/SEC
BH CV ECHO MEAS - MV E/A: 0.71
BH CV ECHO MEAS - MV MAX PG: 2.9 MMHG
BH CV ECHO MEAS - MV MEAN PG: 1.05 MMHG
BH CV ECHO MEAS - MV P1/2T: 79.6 MSEC
BH CV ECHO MEAS - MV V2 VTI: 30.3 CM
BH CV ECHO MEAS - MVA(P1/2T): 2.8 CM2
BH CV ECHO MEAS - PA V2 MAX: 81.1 CM/SEC
BH CV ECHO MEAS - RAP SYSTOLE: 8 MMHG
BH CV ECHO MEAS - RV MAX PG: 1.41 MMHG
BH CV ECHO MEAS - RV V1 MAX: 59.5 CM/SEC
BH CV ECHO MEAS - RV V1 VTI: 13.1 CM
BH CV ECHO MEAS - RVSP: 24.5 MMHG
BH CV ECHO MEAS - TAPSE (>1.6): 2.19 CM
BH CV ECHO MEAS - TR MAX PG: 16.5 MMHG
BH CV ECHO MEAS - TR MAX VEL: 202.9 CM/SEC
BH CV ECHO MEASUREMENTS AVERAGE E/E' RATIO: 8.26
BH CV REST NUCLEAR ISOTOPE DOSE: 10 MCI
BH CV STRESS COMMENTS STAGE 1: NORMAL
BH CV STRESS DOSE REGADENOSON STAGE 1: 0.4
BH CV STRESS DURATION MIN STAGE 1: 0
BH CV STRESS DURATION SEC STAGE 1: 10
BH CV STRESS NUCLEAR ISOTOPE DOSE: 30 MCI
BH CV STRESS PROTOCOL 1: NORMAL
BH CV STRESS RECOVERY BP: NORMAL MMHG
BH CV STRESS RECOVERY HR: 67 BPM
BH CV STRESS STAGE 1: 1
BH CV XLRA - TDI S': 10.1 CM/SEC
LV EF NUC BP: 39 %
MAXIMAL PREDICTED HEART RATE: 148 BPM
PERCENT MAX PREDICTED HR: 51.35 %
SINUS: 3.2 CM
STRESS BASELINE BP: NORMAL MMHG
STRESS BASELINE HR: 58 BPM
STRESS PERCENT HR: 60 %
STRESS POST PEAK BP: NORMAL MMHG
STRESS POST PEAK HR: 76 BPM
STRESS TARGET HR: 126 BPM

## 2024-02-23 NOTE — PROGRESS NOTES
Last stress test did not show ischemia. Results now show small ischemia in apex. Recommend increase Imdur to a full tablet. If he is still having symptoms recommend heart cath.

## 2024-02-26 DIAGNOSIS — I25.9 IHD (ISCHEMIC HEART DISEASE): ICD-10-CM

## 2024-02-26 RX ORDER — ISOSORBIDE MONONITRATE 30 MG/1
30 TABLET, EXTENDED RELEASE ORAL DAILY
Qty: 90 TABLET | Refills: 3 | Status: SHIPPED | OUTPATIENT
Start: 2024-02-26

## 2024-04-30 RX ORDER — METOPROLOL SUCCINATE 25 MG/1
25 TABLET, EXTENDED RELEASE ORAL DAILY
Qty: 90 TABLET | Refills: 1 | Status: SHIPPED | OUTPATIENT
Start: 2024-04-30

## 2024-04-30 RX ORDER — GEMFIBROZIL 600 MG/1
600 TABLET, FILM COATED ORAL DAILY
Qty: 90 TABLET | Refills: 1 | Status: SHIPPED | OUTPATIENT
Start: 2024-04-30

## 2024-05-13 RX ORDER — ROSUVASTATIN CALCIUM 20 MG/1
20 TABLET, COATED ORAL DAILY
Qty: 90 TABLET | Refills: 1 | Status: SHIPPED | OUTPATIENT
Start: 2024-05-13

## 2024-08-19 ENCOUNTER — OFFICE VISIT (OUTPATIENT)
Dept: CARDIOLOGY | Facility: CLINIC | Age: 74
End: 2024-08-19
Payer: COMMERCIAL

## 2024-08-19 VITALS
HEART RATE: 70 BPM | DIASTOLIC BLOOD PRESSURE: 70 MMHG | HEIGHT: 70 IN | WEIGHT: 171 LBS | SYSTOLIC BLOOD PRESSURE: 118 MMHG | BODY MASS INDEX: 24.48 KG/M2

## 2024-08-19 DIAGNOSIS — E78.2 MIXED HYPERLIPIDEMIA: ICD-10-CM

## 2024-08-19 DIAGNOSIS — I34.0 NONRHEUMATIC MITRAL VALVE REGURGITATION: ICD-10-CM

## 2024-08-19 DIAGNOSIS — I10 PRIMARY HYPERTENSION: Primary | ICD-10-CM

## 2024-08-19 DIAGNOSIS — I25.9 IHD (ISCHEMIC HEART DISEASE): ICD-10-CM

## 2024-08-19 NOTE — PROGRESS NOTES
Chief Complaint   Patient presents with    Follow-up     Cardiac management    LABS     Current labs July 2024  A1C- 8.1   CHO_ 103  HDL-  35  LDL- 48  TRI- 101  Current labs results in door for review.    Med Refill     No refills needed today        HPI:  HPI   Flako Mcfarland is a 73 y.o. male with HTN, hyperlipidemia, DM, and IHD diagnosed 2004.  Cath of 9 showed patent LAD stent and 80% disease diagonal.  In 2021 labs revealed decreased H&H.  Aspirin and Plavix were stopped, Carafate prescribed and GI symptoms resolved.  H&H improved. Plavix resumed. On 3/14/2022 repeat stress and echo showed normal LV function and no ischemia.  For better BP management lisinopril 10 mg daily added.     He returns today for follow-up visit labs 7/5/2024: Vitamin B12 285, normal CBC, elevated fasting glucose 101, normal kidney function, normal liver enzymes, normal iron panel, A1c 8.1, total cholesterol 103, triglycerides 101, HDL 35, LDL 48, TSH 2.7. Patient denies chest pain, pressure, palpitations, tightness, dizziness, shortness of air. He has had improvement since increase of isosorbide dose.     Cardiac History:    Past Surgical History:   Procedure Laterality Date    CARDIAC CATHETERIZATION  2004    MI & One stent.    CARDIAC CATHETERIZATION  11/09/2009    Patent stent in the LAD.  80% D2.    CARDIOVASCULAR STRESS TEST  04/14/2008    S. Echo- 9 min, 30 sec., 80% THR. Inferior infarct.    CARDIOVASCULAR STRESS TEST  10/15/2009    9 min. 84% /96 Inferoseptal infarct with lani-infarct ischemia.    CARDIOVASCULAR STRESS TEST  01/11/2013    9 min, 81% THR, 182/80 Large infarct with  mild lani-infarct ischemia.    CARDIOVASCULAR STRESS TEST  01/14/2016    9 Min, 32 Secs.83% THR. EF 54%. Anteroseptal Infarct    CARDIOVASCULAR STRESS TEST  02/27/2018    10 Min, 83% THR. 12.2 Mets. EF 57%. Anterior Infarct,.    CARDIOVASCULAR STRESS TEST  02/13/2020    10 Min. 10.1 METS. 84% THR. BP- 179/79. EF 45%. Apoical Infarct.     CARDIOVASCULAR STRESS TEST  03/14/2022    10 Min. 10.1 METS. 84% THR. BP- 209/84. EF 47%. Apical Infarct    CARDIOVASCULAR STRESS TEST  02/22/2024    Lexiscan- EF 39%. Apical Ischemia    ECHO - CONVERTED  02/29/2008    EF 60%, RVSP- 42mmHg    ECHO - CONVERTED  10/15/2009    EF 55% Inferoseptal WMA. RVSP- 37mmHg    ECHO - CONVERTED  01/11/2013    EF 50-55%, RVSP 40 mmHg.    ECHO - CONVERTED  01/14/2016    EF 50-55%, mild MR, mod pulm HTN    ECHO - CONVERTED  02/27/2018    EF 55%. RVSP- 36 mmHg    ECHO - CONVERTED  02/13/2020    EF 45-50%. Apical WMA. Mild MR. RVSP- 18 mmHg.    ECHO - CONVERTED  03/14/2022    EF 50%. Apical WMA. LA- 4.3 Cm. Mild MR. RVSP- 15 mmHg    ECHO - CONVERTED  02/22/2024    EF 45%. Apical WMA. Trace-Mild MR. R/O ASD. EF 25 mmHg       Current Outpatient Medications   Medication Sig Dispense Refill    clopidogrel (PLAVIX) 75 MG tablet Take 1 tablet by mouth Every Other Day. MUST BE SEEN IN OFFICE FOR FUTURE REFILLS 45 tablet 4    Cyanocobalamin 1000 MCG/ML kit Inject  as directed Every 30 (Thirty) Days.      Dulaglutide 1.5 MG/0.5ML solution pen-injector Inject 3 mg under the skin into the appropriate area as directed 1 (One) Time Per Week.      empagliflozin (JARDIANCE) 25 MG tablet tablet Take 1 tablet by mouth Daily.      ferrous sulfate 324 (65 Fe) MG tablet delayed-release EC tablet Take 1 tablet by mouth Daily With Breakfast.      gemfibrozil (LOPID) 600 MG tablet TAKE 1 TABLET BY MOUTH DAILY 90 tablet 1    glyBURIDE (DIAbeta) 5 MG tablet Take 2 tablets by mouth 2 (Two) Times a Day With Meals.      isosorbide mononitrate (IMDUR) 30 MG 24 hr tablet Take 1 tablet by mouth Daily. 90 tablet 3    Linagliptin (TRADJENTA PO) Take  by mouth Daily.      lisinopril (PRINIVIL,ZESTRIL) 10 MG tablet TAKE ONE TABLET BY MOUTH DAILY 90 tablet 3    metFORMIN (GLUCOPHAGE) 500 MG tablet Take 2 tabs twice daily       metoprolol succinate XL (TOPROL-XL) 25 MG 24 hr tablet TAKE 1 TABLET BY MOUTH DAILY 90 tablet  "1    nitroglycerin (NITROSTAT) 0.4 MG SL tablet 1 under the tongue as needed for angina, may repeat q5mins for up three doses 25 tablet 6    pantoprazole (PROTONIX) 40 MG EC tablet Take 1 tablet by mouth Daily.      rosuvastatin (CRESTOR) 20 MG tablet TAKE 1 TABLET BY MOUTH DAILY 90 tablet 1     No current facility-administered medications for this visit.       Patient has no known allergies.    Past Medical History:   Diagnosis Date    Arthritis     Diabetes mellitus     Hyperlipidemia     Hypertension     IHD (ischemic heart disease)     Chronic    Mitral valve insufficiency        Social History     Socioeconomic History    Marital status:    Tobacco Use    Smoking status: Former     Current packs/day: 0.00     Types: Cigarettes     Quit date: 2004     Years since quittin.2    Smokeless tobacco: Never   Vaping Use    Vaping status: Never Used   Substance and Sexual Activity    Alcohol use: Yes     Comment: Occasional glass of wine    Drug use: No       Family History   Problem Relation Age of Onset    Hypertension Mother     Hyperlipidemia Mother     Heart disease Father        Vitals:   /70 (BP Location: Left arm, Patient Position: Sitting, Cuff Size: Adult)   Pulse 70   Ht 178 cm (70.08\")   Wt 77.6 kg (171 lb)   BMI 24.48 kg/m²     Physical Exam  Vitals and nursing note reviewed.   Neck:      Vascular: No carotid bruit.   Cardiovascular:      Rate and Rhythm: Normal rate and regular rhythm.      Pulses: Normal pulses.      Heart sounds: Normal heart sounds. No murmur heard.     No friction rub. No gallop.   Pulmonary:      Effort: Pulmonary effort is normal.      Breath sounds: Normal breath sounds and air entry.   Musculoskeletal:      Right lower leg: No edema.      Left lower leg: No edema.   Skin:     General: Skin is warm and dry.      Capillary Refill: Capillary refill takes less than 2 seconds.   Neurological:      Mental Status: He is alert and oriented to person, place, and " time.       Procedures     Assessment & Plan     Diagnoses and all orders for this visit:    1. Primary hypertension (Primary)    2. IHD (ischemic heart disease)    3. Mixed hyperlipidemia    4. Nonrheumatic mitral valve regurgitation    HTN  - BP controlled  - Continue current hypertensive medication regimen; isosorbide, Toprol-XL, lisinopril    IHD  - Stable  - Echo 2/22/2024 showed EF 39% and apical ischemia  - Continue Plavix, statin    Hyperlipidemia  - Labs managed with PCP  - Continue rosuvastatin    MR  - Echo 2/22/2024; EF 45%, apical wall motion abnormality, trace to mild MR  - Asymptomatic and stable, continue to monitor periodically    Stable from a cardiac standpoint. No further testing recommended at this time. No refills requested. No medication changes made.      Visit Diagnoses:    ICD-10-CM ICD-9-CM   1. Primary hypertension  I10 401.9   2. IHD (ischemic heart disease)  I25.9 414.9   3. Mixed hyperlipidemia  E78.2 272.2   4. Nonrheumatic mitral valve regurgitation  I34.0 424.0       Meds Ordered During Visit:  No orders of the defined types were placed in this encounter.      Follow Up Appointment:   Return in about 6 months (around 2/19/2025), or if symptoms worsen or fail to improve.           This document has been electronically signed by WAQAR Watkins  August 21, 2024 14:26 EDT    Dictated Utilizing Dragon Dictation: Part of this note may be an electronic transcription/translation of spoken language to printed text using the Dragon Dictation System.

## 2024-08-22 RX ORDER — ROSUVASTATIN CALCIUM 20 MG/1
20 TABLET, COATED ORAL DAILY
Qty: 90 TABLET | Refills: 1 | OUTPATIENT
Start: 2024-08-22

## 2024-08-27 DIAGNOSIS — I10 PRIMARY HYPERTENSION: ICD-10-CM

## 2024-08-28 RX ORDER — LISINOPRIL 10 MG/1
10 TABLET ORAL DAILY
Qty: 90 TABLET | Refills: 1 | Status: SHIPPED | OUTPATIENT
Start: 2024-08-28

## 2024-10-23 RX ORDER — GEMFIBROZIL 600 MG/1
600 TABLET, FILM COATED ORAL DAILY
Qty: 90 TABLET | Refills: 1 | Status: SHIPPED | OUTPATIENT
Start: 2024-10-23

## 2024-10-23 RX ORDER — METOPROLOL SUCCINATE 25 MG/1
25 TABLET, EXTENDED RELEASE ORAL DAILY
Qty: 90 TABLET | Refills: 1 | Status: SHIPPED | OUTPATIENT
Start: 2024-10-23

## 2024-10-23 NOTE — TELEPHONE ENCOUNTER
Rx Refill Note  Requested Prescriptions     Pending Prescriptions Disp Refills    gemfibrozil (LOPID) 600 MG tablet [Pharmacy Med Name: GEMFIBROZIL 600 MG TABLET] 90 tablet 1     Sig: TAKE 1 TABLET BY MOUTH DAILY    metoprolol succinate XL (TOPROL-XL) 25 MG 24 hr tablet [Pharmacy Med Name: METOPROLOL SUCC ER 25 MG TAB] 90 tablet 1     Sig: TAKE 1 TABLET BY MOUTH DAILY      Last office visit with prescribing clinician: 2/15/2024   Last telemedicine visit with prescribing clinician: Visit date not found   Next office visit with prescribing clinician: Visit date not found                         Would you like a call back once the refill request has been completed: [] Yes [] No    If the office needs to give you a call back, can they leave a voicemail: [] Yes [] No    Lisha Jones CMA  10/23/24, 07:51 EDT

## 2025-02-24 DIAGNOSIS — I25.9 IHD (ISCHEMIC HEART DISEASE): ICD-10-CM

## 2025-02-27 RX ORDER — CLOPIDOGREL BISULFATE 75 MG/1
75 TABLET ORAL EVERY OTHER DAY
Qty: 45 TABLET | Refills: 3 | Status: SHIPPED | OUTPATIENT
Start: 2025-02-27

## 2025-02-27 RX ORDER — ISOSORBIDE MONONITRATE 30 MG/1
30 TABLET, EXTENDED RELEASE ORAL DAILY
Qty: 90 TABLET | Refills: 3 | Status: SHIPPED | OUTPATIENT
Start: 2025-02-27

## 2025-03-06 ENCOUNTER — OFFICE VISIT (OUTPATIENT)
Dept: CARDIOLOGY | Facility: CLINIC | Age: 75
End: 2025-03-06
Payer: COMMERCIAL

## 2025-03-06 ENCOUNTER — TELEPHONE (OUTPATIENT)
Dept: CARDIOLOGY | Facility: CLINIC | Age: 75
End: 2025-03-06

## 2025-03-06 VITALS
OXYGEN SATURATION: 97 % | DIASTOLIC BLOOD PRESSURE: 72 MMHG | WEIGHT: 169 LBS | SYSTOLIC BLOOD PRESSURE: 110 MMHG | HEART RATE: 53 BPM | RESPIRATION RATE: 18 BRPM | HEIGHT: 70 IN | BODY MASS INDEX: 24.2 KG/M2

## 2025-03-06 DIAGNOSIS — I25.9 IHD (ISCHEMIC HEART DISEASE): Primary | ICD-10-CM

## 2025-03-06 DIAGNOSIS — E78.2 MIXED HYPERLIPIDEMIA: ICD-10-CM

## 2025-03-06 DIAGNOSIS — R00.1 BRADYCARDIA, SINUS: ICD-10-CM

## 2025-03-06 DIAGNOSIS — I10 PRIMARY HYPERTENSION: ICD-10-CM

## 2025-03-06 PROCEDURE — 93000 ELECTROCARDIOGRAM COMPLETE: CPT | Performed by: NURSE PRACTITIONER

## 2025-03-06 PROCEDURE — 99214 OFFICE O/P EST MOD 30 MIN: CPT | Performed by: NURSE PRACTITIONER

## 2025-03-06 RX ORDER — METOPROLOL SUCCINATE 25 MG/1
12.5 TABLET, EXTENDED RELEASE ORAL DAILY
Start: 2025-03-06

## 2025-03-06 RX ORDER — BLOOD SUGAR DIAGNOSTIC
STRIP MISCELLANEOUS
COMMUNITY

## 2025-03-06 NOTE — PROGRESS NOTES
Chief Complaint   Patient presents with    Follow-up     Cardiac management - Patient states he's been doing well. Patient denies any chest pains, palpitations, shortness of breath, and leg swelling.     Labs Only     Patient states he had recent lab work from his PCP WAQAR Zepeda.     Med Refill     Patient does not need refills at this time.        HPI:  HPI   Flako Mcfarland is a 74 y.o. male with HTN, hyperlipidemia, DM, and IHD diagnosed 2004.  Cath of 9 showed patent LAD stent and 80% disease diagonal.  In 2021 labs revealed decreased H&H.  Aspirin and Plavix were stopped, Carafate prescribed and GI symptoms resolved.  H&H improved. Plavix resumed. On 3/14/2022 repeat stress and echo showed normal LV function and no ischemia.  For better BP management lisinopril 10 mg daily added.     He returns today for follow-up visit. Patient denies chest pain, pressure, palpitations, tightness, dizziness, shortness of air. Labs with PCP within last month and reported as normal.     Cardiac History:    Past Surgical History:   Procedure Laterality Date    CARDIAC CATHETERIZATION  2004    MI & One stent.    CARDIAC CATHETERIZATION  11/09/2009    Patent stent in the LAD.  80% D2.    CARDIOVASCULAR STRESS TEST  04/14/2008    S. Echo- 9 min, 30 sec., 80% THR. Inferior infarct.    CARDIOVASCULAR STRESS TEST  10/15/2009    9 min. 84% /96 Inferoseptal infarct with lani-infarct ischemia.    CARDIOVASCULAR STRESS TEST  01/11/2013    9 min, 81% THR, 182/80 Large infarct with  mild lani-infarct ischemia.    CARDIOVASCULAR STRESS TEST  01/14/2016    9 Min, 32 Secs.83% THR. EF 54%. Anteroseptal Infarct    CARDIOVASCULAR STRESS TEST  02/27/2018    10 Min, 83% THR. 12.2 Mets. EF 57%. Anterior Infarct,.    CARDIOVASCULAR STRESS TEST  02/13/2020    10 Min. 10.1 METS. 84% THR. BP- 179/79. EF 45%. Apoical Infarct.    CARDIOVASCULAR STRESS TEST  03/14/2022    10 Min. 10.1 METS. 84% THR. BP- 209/84. EF 47%. Apical Infarct     CARDIOVASCULAR STRESS TEST  02/22/2024    Lexiscan- EF 39%. Apical Ischemia    ECHO - CONVERTED  02/29/2008    EF 60%, RVSP- 42mmHg    ECHO - CONVERTED  10/15/2009    EF 55% Inferoseptal WMA. RVSP- 37mmHg    ECHO - CONVERTED  01/11/2013    EF 50-55%, RVSP 40 mmHg.    ECHO - CONVERTED  01/14/2016    EF 50-55%, mild MR, mod pulm HTN    ECHO - CONVERTED  02/27/2018    EF 55%. RVSP- 36 mmHg    ECHO - CONVERTED  02/13/2020    EF 45-50%. Apical WMA. Mild MR. RVSP- 18 mmHg.    ECHO - CONVERTED  03/14/2022    EF 50%. Apical WMA. LA- 4.3 Cm. Mild MR. RVSP- 15 mmHg    ECHO - CONVERTED  02/22/2024    EF 45%. Apical WMA. Trace-Mild MR. R/O ASD. EF 25 mmHg       Current Outpatient Medications   Medication Sig Dispense Refill    clopidogrel (PLAVIX) 75 MG tablet TAKE 1 TABLET BY MOUTH EVERY OTHER DAY 45 tablet 3    Cyanocobalamin 1000 MCG/ML kit Inject  as directed Every 30 (Thirty) Days.      Dulaglutide 1.5 MG/0.5ML solution pen-injector Inject 3 mg under the skin into the appropriate area as directed 1 (One) Time Per Week.      empagliflozin (JARDIANCE) 25 MG tablet tablet Take 1 tablet by mouth Daily.      ferrous sulfate 324 (65 Fe) MG tablet delayed-release EC tablet Take 1 tablet by mouth Daily With Breakfast.      gemfibrozil (LOPID) 600 MG tablet TAKE 1 TABLET BY MOUTH DAILY 90 tablet 1    glyBURIDE (DIAbeta) 5 MG tablet Take 2 tablets by mouth 2 (Two) Times a Day With Meals.      isosorbide mononitrate (IMDUR) 30 MG 24 hr tablet TAKE 1 TABLET BY MOUTH DAILY 90 tablet 3    Linagliptin (TRADJENTA PO) Take  by mouth Daily.      lisinopril (PRINIVIL,ZESTRIL) 10 MG tablet TAKE 1 TABLET BY MOUTH DAILY 90 tablet 1    metFORMIN (GLUCOPHAGE) 500 MG tablet Take 2 tabs twice daily       metoprolol succinate XL (TOPROL-XL) 25 MG 24 hr tablet Take 0.5 tablets by mouth Daily.      nitroglycerin (NITROSTAT) 0.4 MG SL tablet 1 under the tongue as needed for angina, may repeat q5mins for up three doses 25 tablet 6    OneTouch Verio test  "strip USE 1 TEST STRIP TO TEST BLOOD GLUCOSE ONCE DAILY TO TWICE DAILY AND AS NEEDED for 40 days      pantoprazole (PROTONIX) 40 MG EC tablet Take 1 tablet by mouth Daily.      rosuvastatin (CRESTOR) 20 MG tablet TAKE 1 TABLET BY MOUTH DAILY 90 tablet 1     No current facility-administered medications for this visit.       Patient has no known allergies.    Past Medical History:   Diagnosis Date    Arthritis     Diabetes mellitus     Hyperlipidemia     Hypertension     IHD (ischemic heart disease)     Chronic    Mitral valve insufficiency        Social History     Socioeconomic History    Marital status:    Tobacco Use    Smoking status: Former     Current packs/day: 0.00     Types: Cigarettes     Quit date: 2004     Years since quittin.7    Smokeless tobacco: Never   Vaping Use    Vaping status: Never Used   Substance and Sexual Activity    Alcohol use: Yes     Comment: Occasional glass of wine    Drug use: No       Family History   Problem Relation Age of Onset    Hypertension Mother     Hyperlipidemia Mother     Heart disease Father        Vitals:   /72 (BP Location: Left arm, Patient Position: Sitting, Cuff Size: Adult)   Pulse 53   Resp 18   Ht 178 cm (70.08\")   Wt 76.7 kg (169 lb)   SpO2 97%   BMI 24.19 kg/m²     Physical Exam  Vitals and nursing note reviewed.   Neck:      Vascular: No carotid bruit.   Cardiovascular:      Rate and Rhythm: Regular rhythm. Bradycardia present.      Pulses: Normal pulses.      Heart sounds: Normal heart sounds. No murmur heard.     No friction rub. No gallop.   Pulmonary:      Effort: Pulmonary effort is normal.      Breath sounds: Normal breath sounds and air entry.   Musculoskeletal:      Right lower leg: No edema.      Left lower leg: No edema.   Skin:     General: Skin is warm and dry.      Capillary Refill: Capillary refill takes less than 2 seconds.   Neurological:      Mental Status: He is alert and oriented to person, place, and time. "         ECG 12 Lead    Date/Time: 3/6/2025 3:50 PM  Performed by: Madelyn Christopher APRN    Authorized by: Madelyn Christopher APRN  Comparison: compared with previous ECG from 3/14/2022  Similar to previous ECG  Rhythm: sinus bradycardia  Rate: bradycardic  BPM: 51  Conduction: right bundle branch block    Clinical impression: non-specific ECG  Comments: QTc 440 ms           Assessment & Plan     Diagnoses and all orders for this visit:    1. IHD (ischemic heart disease) (Primary)    2. Primary hypertension    3. Mixed hyperlipidemia    4. Bradycardia, sinus  -     ECG 12 Lead    Other orders  -     metoprolol succinate XL (TOPROL-XL) 25 MG 24 hr tablet; Take 0.5 tablets by mouth Daily.    IHD  - Stress 2/2024 showed apical ischemia  - Continue Plavix, statin therapy, Imdur    Hypertension  - BP controlled  - Continue current antihypertensive medication regimen    Hyperlipidemia  - Labs managed with PCP  - Continue lipid, rosuvastatin    Bradycardia  - Decrease Toprol to 12.5 mg daily  - EKG sinus bradycardia right bundle branch block rate 51 bpm normal QTc IN intervals    Stable from a cardiac standpoint. No further testing recommended at this time. No refills needed.      Visit Diagnoses:    ICD-10-CM ICD-9-CM   1. IHD (ischemic heart disease)  I25.9 414.9   2. Primary hypertension  I10 401.9   3. Mixed hyperlipidemia  E78.2 272.2   4. Bradycardia, sinus  R00.1 427.89       Meds Ordered During Visit:  New Medications Ordered This Visit   Medications    metoprolol succinate XL (TOPROL-XL) 25 MG 24 hr tablet     Sig: Take 0.5 tablets by mouth Daily.       Follow Up Appointment:   Return in about 6 months (around 9/6/2025), or if symptoms worsen or fail to improve.           This document has been electronically signed by WAQAR Watkins  March 6, 2025 15:59 EST    Dictated Utilizing Dragon Dictation: Part of this note may be an electronic transcription/translation of spoken language to printed text using the Dragon  Dictation System.

## 2025-03-06 NOTE — TELEPHONE ENCOUNTER
Please let patient know to decrease Toprol to 12.5 mg daily.  He is currently taken 25 mg so he can half that tablet.  His heart rate was just a little bit too low today

## 2025-03-10 NOTE — TELEPHONE ENCOUNTER
Patient made aware to decrease Toprol to 12.5 mg daily related to low heart rate at visit. Patient voiced understanding and to take half of his current 25 mg tablet.

## 2025-04-21 RX ORDER — GEMFIBROZIL 600 MG/1
600 TABLET, FILM COATED ORAL DAILY
Qty: 90 TABLET | Refills: 1 | Status: SHIPPED | OUTPATIENT
Start: 2025-04-21

## 2025-04-21 RX ORDER — METOPROLOL SUCCINATE 25 MG/1
25 TABLET, EXTENDED RELEASE ORAL DAILY
Qty: 90 TABLET | Refills: 3 | Status: SHIPPED | OUTPATIENT
Start: 2025-04-21

## 2025-05-26 DIAGNOSIS — R07.89 OTHER CHEST PAIN: ICD-10-CM

## 2025-05-26 DIAGNOSIS — I25.9 IHD (ISCHEMIC HEART DISEASE): ICD-10-CM

## 2025-05-27 RX ORDER — NITROGLYCERIN 0.4 MG/1
0.4 TABLET SUBLINGUAL AS NEEDED
Qty: 25 TABLET | Refills: 1 | Status: SHIPPED | OUTPATIENT
Start: 2025-05-27

## 2025-05-30 RX ORDER — ROSUVASTATIN CALCIUM 20 MG/1
20 TABLET, COATED ORAL DAILY
Qty: 90 TABLET | Refills: 3 | Status: SHIPPED | OUTPATIENT
Start: 2025-05-30